# Patient Record
Sex: FEMALE | Race: BLACK OR AFRICAN AMERICAN | NOT HISPANIC OR LATINO | Employment: OTHER | URBAN - METROPOLITAN AREA
[De-identification: names, ages, dates, MRNs, and addresses within clinical notes are randomized per-mention and may not be internally consistent; named-entity substitution may affect disease eponyms.]

---

## 2023-07-26 DIAGNOSIS — E03.9 HYPOTHYROIDISM, UNSPECIFIED: ICD-10-CM

## 2023-07-26 RX ORDER — LEVOTHYROXINE SODIUM 100 UG/1
100 TABLET ORAL DAILY
Qty: 90 TABLET | Refills: 1 | OUTPATIENT
Start: 2023-07-26

## 2023-07-26 NOTE — TELEPHONE ENCOUNTER
Please call to schedule an appointment. We need to check your thyroid levels and perform an exam before prescribing.

## 2023-08-31 DIAGNOSIS — E03.9 HYPOTHYROIDISM, UNSPECIFIED TYPE: Primary | ICD-10-CM

## 2023-08-31 RX ORDER — LEVOTHYROXINE SODIUM 100 UG/1
100 TABLET ORAL DAILY
Qty: 30 TABLET | Refills: 0 | Status: SHIPPED | OUTPATIENT
Start: 2023-08-31 | End: 2023-09-12 | Stop reason: SDUPTHER

## 2023-08-31 RX ORDER — LEVOTHYROXINE SODIUM 100 UG/1
1 TABLET ORAL DAILY
COMMUNITY
Start: 2012-12-07 | End: 2023-08-31 | Stop reason: SDUPTHER

## 2023-09-08 PROBLEM — R73.9 HYPERGLYCEMIA: Status: ACTIVE | Noted: 2023-09-08

## 2023-09-08 PROBLEM — M25.519 SHOULDER PAIN: Status: ACTIVE | Noted: 2023-09-08

## 2023-09-08 PROBLEM — R52 BODY ACHES: Status: ACTIVE | Noted: 2023-09-08

## 2023-09-08 PROBLEM — L30.9 DERMATITIS, ECZEMATOID: Status: ACTIVE | Noted: 2019-01-02

## 2023-09-08 PROBLEM — M54.50 LOW BACK PAIN: Status: ACTIVE | Noted: 2023-09-08

## 2023-09-08 PROBLEM — R92.8 ABNORMAL MAMMOGRAM OF LEFT BREAST: Status: ACTIVE | Noted: 2023-09-08

## 2023-09-08 PROBLEM — R10.814 LEFT LOWER QUADRANT ABDOMINAL TENDERNESS: Status: ACTIVE | Noted: 2023-09-08

## 2023-09-08 PROBLEM — M16.9 OSTEOARTHRITIS OF HIP: Status: ACTIVE | Noted: 2023-09-08

## 2023-09-08 PROBLEM — E66.9 OBESITY: Status: ACTIVE | Noted: 2023-09-08

## 2023-09-08 PROBLEM — J30.9 ALLERGIC RHINITIS: Status: ACTIVE | Noted: 2023-09-08

## 2023-09-08 PROBLEM — R63.5 WEIGHT GAIN: Status: ACTIVE | Noted: 2023-09-08

## 2023-09-08 PROBLEM — M25.552 LEFT HIP PAIN: Status: ACTIVE | Noted: 2023-09-08

## 2023-09-08 PROBLEM — M79.2 NEUROPATHIC PAIN OF LEFT THIGH: Status: ACTIVE | Noted: 2023-09-08

## 2023-09-08 PROBLEM — J31.0 CHRONIC RHINITIS: Status: ACTIVE | Noted: 2023-09-08

## 2023-09-08 PROBLEM — R21 SKIN RASH: Status: ACTIVE | Noted: 2023-09-08

## 2023-09-08 PROBLEM — M16.12 ARTHRITIS OF LEFT HIP: Status: ACTIVE | Noted: 2023-09-08

## 2023-09-08 PROBLEM — R20.2 PARESTHESIA OF LEFT FOOT: Status: ACTIVE | Noted: 2023-09-08

## 2023-09-08 PROBLEM — E55.9 VITAMIN D DEFICIENCY: Status: ACTIVE | Noted: 2023-09-08

## 2023-09-08 PROBLEM — R13.10 DYSPHAGIA: Status: ACTIVE | Noted: 2023-09-08

## 2023-09-08 PROBLEM — E03.9 HYPOTHYROIDISM: Status: ACTIVE | Noted: 2023-09-08

## 2023-09-08 PROBLEM — R30.0 DYSURIA: Status: ACTIVE | Noted: 2023-09-08

## 2023-09-08 PROBLEM — L30.0 NUMMULAR DERMATITIS: Status: ACTIVE | Noted: 2019-01-02

## 2023-09-08 PROBLEM — R07.9 CHEST PAIN: Status: ACTIVE | Noted: 2023-09-08

## 2023-09-08 PROBLEM — E78.5 HYPERLIPIDEMIA: Status: ACTIVE | Noted: 2023-09-08

## 2023-09-08 RX ORDER — TRIAMCINOLONE ACETONIDE 1 MG/G
CREAM TOPICAL
COMMUNITY
Start: 2016-12-20 | End: 2023-09-12 | Stop reason: WASHOUT

## 2023-09-08 RX ORDER — CHOLECALCIFEROL (VITAMIN D3) 50 MCG
1 TABLET ORAL DAILY
COMMUNITY
Start: 2023-02-02

## 2023-09-12 ENCOUNTER — LAB (OUTPATIENT)
Dept: LAB | Facility: LAB | Age: 63
End: 2023-09-12
Payer: COMMERCIAL

## 2023-09-12 ENCOUNTER — OFFICE VISIT (OUTPATIENT)
Dept: PRIMARY CARE | Facility: CLINIC | Age: 63
End: 2023-09-12
Payer: COMMERCIAL

## 2023-09-12 VITALS — WEIGHT: 240 LBS | SYSTOLIC BLOOD PRESSURE: 123 MMHG | BODY MASS INDEX: 39.94 KG/M2 | DIASTOLIC BLOOD PRESSURE: 76 MMHG

## 2023-09-12 DIAGNOSIS — R73.03 PREDIABETES: ICD-10-CM

## 2023-09-12 DIAGNOSIS — E55.9 VITAMIN D DEFICIENCY: ICD-10-CM

## 2023-09-12 DIAGNOSIS — Z00.00 ROUTINE GENERAL MEDICAL EXAMINATION AT A HEALTH CARE FACILITY: Primary | ICD-10-CM

## 2023-09-12 DIAGNOSIS — Z23 NEED FOR MMR VACCINE: ICD-10-CM

## 2023-09-12 DIAGNOSIS — E03.9 HYPOTHYROIDISM, UNSPECIFIED TYPE: ICD-10-CM

## 2023-09-12 DIAGNOSIS — R73.9 HYPERGLYCEMIA: ICD-10-CM

## 2023-09-12 DIAGNOSIS — Z12.4 SCREENING FOR CERVICAL CANCER: ICD-10-CM

## 2023-09-12 DIAGNOSIS — E78.2 MODERATE MIXED HYPERLIPIDEMIA NOT REQUIRING STATIN THERAPY: ICD-10-CM

## 2023-09-12 LAB
CALCIDIOL (25 OH VITAMIN D3) (NG/ML) IN SER/PLAS: 20 NG/ML
CHOLESTEROL (MG/DL) IN SER/PLAS: 225 MG/DL (ref 0–199)
CHOLESTEROL IN HDL (MG/DL) IN SER/PLAS: 83.7 MG/DL
CHOLESTEROL/HDL RATIO: 2.7
ESTIMATED AVERAGE GLUCOSE FOR HBA1C: 114 MG/DL
HEMOGLOBIN A1C/HEMOGLOBIN TOTAL IN BLOOD: 5.6 %
LDL: 125 MG/DL (ref 0–99)
THYROTROPIN (MIU/L) IN SER/PLAS BY DETECTION LIMIT <= 0.05 MIU/L: 1.55 MIU/L (ref 0.44–3.98)
TRIGLYCERIDE (MG/DL) IN SER/PLAS: 81 MG/DL (ref 0–149)
VLDL: 16 MG/DL (ref 0–40)

## 2023-09-12 PROCEDURE — 84443 ASSAY THYROID STIM HORMONE: CPT

## 2023-09-12 PROCEDURE — 99396 PREV VISIT EST AGE 40-64: CPT | Performed by: INTERNAL MEDICINE

## 2023-09-12 PROCEDURE — 36415 COLL VENOUS BLD VENIPUNCTURE: CPT

## 2023-09-12 PROCEDURE — 80061 LIPID PANEL: CPT

## 2023-09-12 PROCEDURE — 82306 VITAMIN D 25 HYDROXY: CPT

## 2023-09-12 PROCEDURE — 83036 HEMOGLOBIN GLYCOSYLATED A1C: CPT

## 2023-09-12 RX ORDER — CHOLECALCIFEROL (VITAMIN D3) 50 MCG
50 TABLET ORAL DAILY
Qty: 90 TABLET | Refills: 1 | Status: CANCELLED | OUTPATIENT
Start: 2023-09-12

## 2023-09-12 RX ORDER — LEVOTHYROXINE SODIUM 100 UG/1
100 TABLET ORAL DAILY
Qty: 90 TABLET | Refills: 1 | Status: SHIPPED | OUTPATIENT
Start: 2023-09-12 | End: 2024-03-26

## 2023-09-12 ASSESSMENT — PROMIS GLOBAL HEALTH SCALE
EMOTIONAL_PROBLEMS: RARELY
CARRYOUT_SOCIAL_ACTIVITIES: GOOD
RATE_AVERAGE_PAIN: 2
RATE_MENTAL_HEALTH: VERY GOOD
CARRYOUT_PHYSICAL_ACTIVITIES: MOSTLY
RATE_GENERAL_HEALTH: FAIR
RATE_SOCIAL_SATISFACTION: VERY GOOD
RATE_AVERAGE_FATIGUE: MILD
RATE_QUALITY_OF_LIFE: GOOD
RATE_PHYSICAL_HEALTH: GOOD

## 2023-09-12 NOTE — PROGRESS NOTES
Subjective   Patient ID: Dora Trimble is a 63 y.o. female who presents for medications refill and annual medical visit    Med Refill       63-year-old female with past medical history of vitamin D deficiency, HLD, prediabetes, hypothyroidism, and hyperlipidemia who presents to the office for follow-up, patient reported that since her last visit here she has been doing well denies any recent hospitalization or illnesses and denies any further or new complaints.     Review of Systems   All other systems reviewed and are negative.      Objective   /76   Wt 109 kg (240 lb)   BMI 39.94 kg/m²     Physical Exam  Vitals and nursing note reviewed.   Constitutional:       Appearance: Normal appearance. She is normal weight.   HENT:      Head: Normocephalic and atraumatic.      Right Ear: Tympanic membrane normal.      Nose: Nose normal.      Mouth/Throat:      Mouth: Mucous membranes are moist.   Eyes:      Extraocular Movements: Extraocular movements intact.   Cardiovascular:      Rate and Rhythm: Normal rate.      Pulses: Normal pulses.      Heart sounds: Normal heart sounds.   Pulmonary:      Effort: Pulmonary effort is normal.      Breath sounds: Normal breath sounds.   Abdominal:      General: Abdomen is flat. Bowel sounds are normal.      Palpations: Abdomen is soft.   Musculoskeletal:         General: Normal range of motion.   Skin:     General: Skin is warm and dry.      Capillary Refill: Capillary refill takes less than 2 seconds.   Neurological:      General: No focal deficit present.      Mental Status: She is alert and oriented to person, place, and time.   Psychiatric:         Mood and Affect: Mood normal.         Behavior: Behavior normal.         Thought Content: Thought content normal.         Judgment: Judgment normal.         Assessment/Plan   Diagnoses and all orders for this visit:  Vitamin D deficiency  -     Vitamin D 25-Hydroxy,Total (for eval of Vitamin D levels); Future  Hypothyroidism,  unspecified type  -     levothyroxine (Synthroid, Levoxyl) 100 mcg tablet; Take 1 tablet (100 mcg) by mouth once daily.  -     Tsh With Reflex To Free T4 If Abnormal; Future  Moderate mixed hyperlipidemia not requiring statin therapy  -     Lipid panel; Future  Hyperglycemia  Prediabetes  -     Hemoglobin A1C; Future  Need for MMR vaccine  Screening for cervical cancer

## 2023-09-18 RX ORDER — ACETAMINOPHEN 500 MG
1 TABLET ORAL EVERY 6 HOURS PRN
COMMUNITY
Start: 2022-11-01 | End: 2024-03-12 | Stop reason: WASHOUT

## 2023-09-18 RX ORDER — METHOCARBAMOL 500 MG/1
1 TABLET, FILM COATED ORAL EVERY 6 HOURS PRN
COMMUNITY
Start: 2022-11-01 | End: 2024-03-12 | Stop reason: WASHOUT

## 2023-10-26 ENCOUNTER — APPOINTMENT (OUTPATIENT)
Dept: ORTHOPEDIC SURGERY | Facility: CLINIC | Age: 63
End: 2023-10-26
Payer: COMMERCIAL

## 2024-03-12 ENCOUNTER — TELEMEDICINE (OUTPATIENT)
Dept: PRIMARY CARE | Facility: CLINIC | Age: 64
End: 2024-03-12
Payer: COMMERCIAL

## 2024-03-12 DIAGNOSIS — J06.9 UPPER RESPIRATORY TRACT INFECTION, UNSPECIFIED TYPE: ICD-10-CM

## 2024-03-12 DIAGNOSIS — Z12.31 ENCOUNTER FOR SCREENING MAMMOGRAM FOR MALIGNANT NEOPLASM OF BREAST: Primary | ICD-10-CM

## 2024-03-12 DIAGNOSIS — E55.9 VITAMIN D DEFICIENCY: ICD-10-CM

## 2024-03-12 PROCEDURE — 99214 OFFICE O/P EST MOD 30 MIN: CPT | Performed by: INTERNAL MEDICINE

## 2024-03-12 RX ORDER — CETIRIZINE HYDROCHLORIDE 10 MG/1
10 TABLET ORAL DAILY
Qty: 30 TABLET | Refills: 0 | Status: SHIPPED | OUTPATIENT
Start: 2024-03-12 | End: 2024-04-04

## 2024-03-12 RX ORDER — AZITHROMYCIN 250 MG/1
TABLET, FILM COATED ORAL
Qty: 6 TABLET | Refills: 0 | Status: SHIPPED | OUTPATIENT
Start: 2024-03-12 | End: 2024-03-17

## 2024-03-12 RX ORDER — ASPIRIN 325 MG
50000 TABLET, DELAYED RELEASE (ENTERIC COATED) ORAL
Qty: 8 CAPSULE | Refills: 0 | Status: SHIPPED | OUTPATIENT
Start: 2024-03-12 | End: 2025-03-12

## 2024-03-12 RX ORDER — FLUTICASONE PROPIONATE 50 MCG
1 SPRAY, SUSPENSION (ML) NASAL DAILY
Qty: 16 G | Refills: 5 | Status: SHIPPED | OUTPATIENT
Start: 2024-03-12 | End: 2025-03-12

## 2024-03-12 ASSESSMENT — ENCOUNTER SYMPTOMS
COUGH: 1
SWEATS: 0
RHINORRHEA: 1
WEIGHT LOSS: 0
FEVER: 0
MYALGIAS: 0
HEARTBURN: 0
SHORTNESS OF BREATH: 0
CHILLS: 0
HEMOPTYSIS: 0
WHEEZING: 0
HEADACHES: 0
SORE THROAT: 0

## 2024-03-12 NOTE — PROGRESS NOTES
Subjective   Patient ID: Dora Trimble is a 63 y.o. female who presents via virtual visit for Sick Visit.  An interactive audio and video telecommunication system which permits real time communications between the patient (at the originating site) and provider (at the distant site) was utilized to provide this telehealth service.    Verbal consent was requested and obtained from the patient on the day of encounter.  Cough  The current episode started more than 1 month ago. The problem has been unchanged. The problem occurs every few hours. The cough is Productive of sputum. Associated symptoms include ear congestion, nasal congestion, postnasal drip and rhinorrhea. Pertinent negatives include no chest pain, chills, ear pain, fever, headaches, heartburn, hemoptysis, myalgias, rash, sore throat, shortness of breath, sweats, weight loss or wheezing. The symptoms are aggravated by lying down.       Review of Systems   Constitutional:  Negative for chills, fever and weight loss.   HENT:  Positive for postnasal drip and rhinorrhea. Negative for ear pain and sore throat.    Respiratory:  Positive for cough. Negative for hemoptysis, shortness of breath and wheezing.    Cardiovascular:  Negative for chest pain.   Gastrointestinal:  Negative for heartburn.   Musculoskeletal:  Negative for myalgias.   Skin:  Negative for rash.   Neurological:  Negative for headaches.       Objective   Physical Exam  Constitutional:       General: She is awake.      Appearance: Normal appearance. She is well-developed.   Neurological:      Mental Status: She is alert.   Psychiatric:         Mood and Affect: Mood normal.         Behavior: Behavior normal.         Assessment/Plan     Problem List Items Addressed This Visit       Vitamin D deficiency    Relevant Medications    cholecalciferol (Vitamin D-3) 50,000 unit capsule     Other Visit Diagnoses       Encounter for screening mammogram for malignant neoplasm of breast    -  Primary     Relevant Orders    BI mammo bilateral screening tomosynthesis    Upper respiratory tract infection, unspecified type        Relevant Medications    azithromycin (Zithromax) 250 mg tablet    cetirizine (ZyrTEC) 10 mg tablet    fluticasone (Flonase) 50 mcg/actuation nasal spray

## 2024-03-25 DIAGNOSIS — E03.9 HYPOTHYROIDISM, UNSPECIFIED TYPE: ICD-10-CM

## 2024-03-26 RX ORDER — LEVOTHYROXINE SODIUM 100 UG/1
100 TABLET ORAL DAILY
Qty: 90 TABLET | Refills: 1 | Status: SHIPPED | OUTPATIENT
Start: 2024-03-26

## 2024-04-03 DIAGNOSIS — J06.9 UPPER RESPIRATORY TRACT INFECTION, UNSPECIFIED TYPE: ICD-10-CM

## 2024-04-04 RX ORDER — CETIRIZINE HYDROCHLORIDE 10 MG/1
10 TABLET ORAL DAILY
Qty: 90 TABLET | Refills: 0 | Status: SHIPPED | OUTPATIENT
Start: 2024-04-04

## 2024-04-16 ENCOUNTER — HOSPITAL ENCOUNTER (OUTPATIENT)
Dept: RADIOLOGY | Facility: CLINIC | Age: 64
Discharge: HOME | End: 2024-04-16
Payer: COMMERCIAL

## 2024-04-16 VITALS — WEIGHT: 235 LBS | BODY MASS INDEX: 39.15 KG/M2 | HEIGHT: 65 IN

## 2024-04-16 DIAGNOSIS — Z12.31 ENCOUNTER FOR SCREENING MAMMOGRAM FOR MALIGNANT NEOPLASM OF BREAST: ICD-10-CM

## 2024-04-16 PROCEDURE — 77067 SCR MAMMO BI INCL CAD: CPT | Performed by: RADIOLOGY

## 2024-04-16 PROCEDURE — 77063 BREAST TOMOSYNTHESIS BI: CPT | Performed by: RADIOLOGY

## 2024-04-16 PROCEDURE — 77067 SCR MAMMO BI INCL CAD: CPT

## 2024-08-29 ENCOUNTER — HOSPITAL ENCOUNTER (OUTPATIENT)
Dept: RADIOLOGY | Facility: CLINIC | Age: 64
Discharge: HOME | End: 2024-08-29

## 2024-08-29 ENCOUNTER — OFFICE VISIT (OUTPATIENT)
Dept: ORTHOPEDIC SURGERY | Facility: CLINIC | Age: 64
End: 2024-08-29

## 2024-08-29 VITALS — WEIGHT: 235 LBS | HEIGHT: 65 IN | BODY MASS INDEX: 39.15 KG/M2

## 2024-08-29 DIAGNOSIS — M25.552 LEFT HIP PAIN: ICD-10-CM

## 2024-08-29 DIAGNOSIS — M16.12 PRIMARY OSTEOARTHRITIS OF LEFT HIP: Primary | ICD-10-CM

## 2024-08-29 PROCEDURE — 99213 OFFICE O/P EST LOW 20 MIN: CPT | Performed by: ORTHOPAEDIC SURGERY

## 2024-08-29 PROCEDURE — 73502 X-RAY EXAM HIP UNI 2-3 VIEWS: CPT | Mod: LT

## 2024-08-29 PROCEDURE — 3008F BODY MASS INDEX DOCD: CPT | Performed by: ORTHOPAEDIC SURGERY

## 2024-08-29 ASSESSMENT — PAIN DESCRIPTION - DESCRIPTORS: DESCRIPTORS: ACHING

## 2024-08-29 ASSESSMENT — PAIN SCALES - GENERAL: PAINLEVEL_OUTOF10: 8

## 2024-08-29 ASSESSMENT — PAIN - FUNCTIONAL ASSESSMENT: PAIN_FUNCTIONAL_ASSESSMENT: 0-10

## 2024-08-29 NOTE — PROGRESS NOTES
Patient is a 64-year-old female presents today for evaluation of left hip arthritis.  She has known advanced underlying osteoarthritis.  She takes occasional Tylenol.  She does not take any significant anti-inflammatories.  She is never had an injection in her hip.  Her BMI is 39.  She has difficulty walking sort of distance or putting on her shoes and socks.  She rates her pain at times as 8 out of 10.    Left hip:  AAOx3, NAD, walks with a moderate antalgic gait  Significant pain with flexion internal rotation  Positive [facility]  Mild TTP over trochanter laterally  5/5 Hip flexion/knee extension/df/pf/ehl  SILT in a jf/saph/per/tib distribution  ½ dorsalis pedis and posterior tibial pulse  no popliteal or inguinal lymphadenopathy  no other overlying lesions  mood: euthymic  Respirations non labored    Plain films were reviewed by myself in clinic today.  She has advanced osteoarthritis of her left hip with complete loss of joint space, underlying sclerosis and bipolar osteophytic change.    We discussed further conservative treatment versus surgery with her today in clinic.  I think injections will be of little benefit to her.  She is get my office card and number and call to schedule surgery if she decides to proceed.  She needs to work on weight loss is much as she can.  All of her questions were answered.  Would see her back at a time for a full surgical consult.    [id number] [id number]  [facility]  Outpatient

## 2024-09-03 ENCOUNTER — APPOINTMENT (OUTPATIENT)
Dept: PRIMARY CARE | Facility: CLINIC | Age: 64
End: 2024-09-03
Payer: COMMERCIAL

## 2024-09-03 ENCOUNTER — LAB (OUTPATIENT)
Dept: LAB | Facility: LAB | Age: 64
End: 2024-09-03
Payer: COMMERCIAL

## 2024-09-03 VITALS
BODY MASS INDEX: 40.98 KG/M2 | SYSTOLIC BLOOD PRESSURE: 163 MMHG | HEART RATE: 60 BPM | DIASTOLIC BLOOD PRESSURE: 82 MMHG | WEIGHT: 246 LBS | HEIGHT: 65 IN

## 2024-09-03 DIAGNOSIS — E55.9 VITAMIN D DEFICIENCY: ICD-10-CM

## 2024-09-03 DIAGNOSIS — E78.5 HYPERLIPIDEMIA, UNSPECIFIED HYPERLIPIDEMIA TYPE: ICD-10-CM

## 2024-09-03 DIAGNOSIS — M25.552 LEFT HIP PAIN: ICD-10-CM

## 2024-09-03 DIAGNOSIS — Z00.00 GENERAL MEDICAL EXAM: ICD-10-CM

## 2024-09-03 DIAGNOSIS — R03.0 ELEVATED BLOOD PRESSURE READING: ICD-10-CM

## 2024-09-03 DIAGNOSIS — E03.9 HYPOTHYROIDISM, UNSPECIFIED TYPE: ICD-10-CM

## 2024-09-03 DIAGNOSIS — R73.9 HYPERGLYCEMIA: ICD-10-CM

## 2024-09-03 DIAGNOSIS — Z00.00 GENERAL MEDICAL EXAM: Primary | ICD-10-CM

## 2024-09-03 DIAGNOSIS — E66.01 CLASS 3 SEVERE OBESITY WITHOUT SERIOUS COMORBIDITY WITH BODY MASS INDEX (BMI) OF 40.0 TO 44.9 IN ADULT, UNSPECIFIED OBESITY TYPE (MULTI): ICD-10-CM

## 2024-09-03 LAB
25(OH)D3 SERPL-MCNC: 29 NG/ML (ref 30–100)
ALBUMIN SERPL BCP-MCNC: 4 G/DL (ref 3.4–5)
ALP SERPL-CCNC: 59 U/L (ref 33–136)
ALT SERPL W P-5'-P-CCNC: 11 U/L (ref 7–45)
ANION GAP SERPL CALC-SCNC: 14 MMOL/L (ref 10–20)
AST SERPL W P-5'-P-CCNC: 14 U/L (ref 9–39)
BASOPHILS # BLD AUTO: 0.05 X10*3/UL (ref 0–0.1)
BASOPHILS NFR BLD AUTO: 0.8 %
BILIRUB SERPL-MCNC: 0.4 MG/DL (ref 0–1.2)
BUN SERPL-MCNC: 15 MG/DL (ref 6–23)
CALCIUM SERPL-MCNC: 8.3 MG/DL (ref 8.6–10.6)
CHLORIDE SERPL-SCNC: 102 MMOL/L (ref 98–107)
CHOLEST SERPL-MCNC: 218 MG/DL (ref 0–199)
CHOLESTEROL/HDL RATIO: 2.9
CO2 SERPL-SCNC: 29 MMOL/L (ref 21–32)
CREAT SERPL-MCNC: 0.78 MG/DL (ref 0.5–1.05)
EGFRCR SERPLBLD CKD-EPI 2021: 85 ML/MIN/1.73M*2
EOSINOPHIL # BLD AUTO: 0.13 X10*3/UL (ref 0–0.7)
EOSINOPHIL NFR BLD AUTO: 2.1 %
ERYTHROCYTE [DISTWIDTH] IN BLOOD BY AUTOMATED COUNT: 14.5 % (ref 11.5–14.5)
EST. AVERAGE GLUCOSE BLD GHB EST-MCNC: 120 MG/DL
GLUCOSE SERPL-MCNC: 109 MG/DL (ref 74–99)
HBA1C MFR BLD: 5.8 %
HCT VFR BLD AUTO: 45.2 % (ref 36–46)
HDLC SERPL-MCNC: 74.5 MG/DL
HGB BLD-MCNC: 13.7 G/DL (ref 12–16)
IMM GRANULOCYTES # BLD AUTO: 0.04 X10*3/UL (ref 0–0.7)
IMM GRANULOCYTES NFR BLD AUTO: 0.6 % (ref 0–0.9)
LDLC SERPL CALC-MCNC: 127 MG/DL
LYMPHOCYTES # BLD AUTO: 1.95 X10*3/UL (ref 1.2–4.8)
LYMPHOCYTES NFR BLD AUTO: 31 %
MCH RBC QN AUTO: 27.1 PG (ref 26–34)
MCHC RBC AUTO-ENTMCNC: 30.3 G/DL (ref 32–36)
MCV RBC AUTO: 90 FL (ref 80–100)
MONOCYTES # BLD AUTO: 0.39 X10*3/UL (ref 0.1–1)
MONOCYTES NFR BLD AUTO: 6.2 %
NEUTROPHILS # BLD AUTO: 3.74 X10*3/UL (ref 1.2–7.7)
NEUTROPHILS NFR BLD AUTO: 59.3 %
NON HDL CHOLESTEROL: 144 MG/DL (ref 0–149)
NRBC BLD-RTO: 0 /100 WBCS (ref 0–0)
PLATELET # BLD AUTO: 329 X10*3/UL (ref 150–450)
POTASSIUM SERPL-SCNC: 4.3 MMOL/L (ref 3.5–5.3)
PROT SERPL-MCNC: 7.5 G/DL (ref 6.4–8.2)
RBC # BLD AUTO: 5.05 X10*6/UL (ref 4–5.2)
SODIUM SERPL-SCNC: 141 MMOL/L (ref 136–145)
TRIGL SERPL-MCNC: 84 MG/DL (ref 0–149)
TSH SERPL-ACNC: 1.5 MIU/L (ref 0.44–3.98)
VLDL: 17 MG/DL (ref 0–40)
WBC # BLD AUTO: 6.3 X10*3/UL (ref 4.4–11.3)

## 2024-09-03 PROCEDURE — 83036 HEMOGLOBIN GLYCOSYLATED A1C: CPT

## 2024-09-03 PROCEDURE — 84443 ASSAY THYROID STIM HORMONE: CPT

## 2024-09-03 PROCEDURE — 3008F BODY MASS INDEX DOCD: CPT | Performed by: INTERNAL MEDICINE

## 2024-09-03 PROCEDURE — 36415 COLL VENOUS BLD VENIPUNCTURE: CPT

## 2024-09-03 PROCEDURE — 99396 PREV VISIT EST AGE 40-64: CPT | Performed by: INTERNAL MEDICINE

## 2024-09-03 PROCEDURE — 1036F TOBACCO NON-USER: CPT | Performed by: INTERNAL MEDICINE

## 2024-09-03 PROCEDURE — 82306 VITAMIN D 25 HYDROXY: CPT

## 2024-09-03 PROCEDURE — 80061 LIPID PANEL: CPT

## 2024-09-03 PROCEDURE — 85025 COMPLETE CBC W/AUTO DIFF WBC: CPT

## 2024-09-03 PROCEDURE — 80053 COMPREHEN METABOLIC PANEL: CPT

## 2024-09-03 RX ORDER — LEVOTHYROXINE SODIUM 100 UG/1
100 TABLET ORAL DAILY
Qty: 90 TABLET | Refills: 1 | Status: SHIPPED | OUTPATIENT
Start: 2024-09-03

## 2024-09-03 RX ORDER — MELOXICAM 15 MG/1
15 TABLET ORAL DAILY
Qty: 90 TABLET | Refills: 1 | Status: SHIPPED | OUTPATIENT
Start: 2024-09-03 | End: 2025-03-02

## 2024-09-03 ASSESSMENT — LIFESTYLE VARIABLES
HOW MANY STANDARD DRINKS CONTAINING ALCOHOL DO YOU HAVE ON A TYPICAL DAY: 1 OR 2
SKIP TO QUESTIONS 9-10: 1
AUDIT-C TOTAL SCORE: 1
HOW OFTEN DO YOU HAVE SIX OR MORE DRINKS ON ONE OCCASION: NEVER
HOW OFTEN DO YOU HAVE A DRINK CONTAINING ALCOHOL: MONTHLY OR LESS

## 2024-09-03 ASSESSMENT — PATIENT HEALTH QUESTIONNAIRE - PHQ9
1. LITTLE INTEREST OR PLEASURE IN DOING THINGS: NOT AT ALL
2. FEELING DOWN, DEPRESSED OR HOPELESS: NOT AT ALL
SUM OF ALL RESPONSES TO PHQ9 QUESTIONS 1 AND 2: 0

## 2024-09-03 NOTE — PROGRESS NOTES
Primary care office Note      Name: Dora Trimble, Age: 64 y.o., Gender: female, MRN: [id number]   Pharmacy:   [facility]/pharmacy #[id number] [facility] [address] [facility]  [address] [facility] [address]  Phone: [phone] Fax: [phone]     PCP: Olga Johnson        Subjective:   Chief Complaint   Patient presents with    Annual Exam        Dora Trimble is a 64 y.o. female who presented to the clinic today for CPE    HPI:   Dora Trimble is a 64 y.o. female     Loose stools  -states maybe from too much junk food, likes to eat sweet  -pretty frequent  -imodium seems to help     Left hip  -saw ortho, going to have hip replacement    The patient denies headaches, lightheadedness, changes in speech/vision, photophobia, dysphagia, diaphoresis, Chest pain, dyspnea, Abdominal pain, recent falls or trauma, and changes in bowel/urinary habits.     ROS:   12 points review of system is negative excepted as stated above in the HPI.    Medical History      PMH:    has a past medical history of Acute upper respiratory infection, unspecified (01/28/2016), Body mass index (BMI)40.0-44.9, adult (01/31/2020), Cellulitis of left lower limb (01/03/2014), Encounter for gynecological examination (general) (routine) without abnormal findings (12/11/2013), Other bursitis of hip, left hip (12/11/2013), Pain in throat (05/06/2015), Pain in unspecified hip (12/11/2013), Personal history of other diseases of the nervous system and sense organs (05/19/2014), Personal history of other diseases of the respiratory system (07/23/2014), Personal history of other endocrine, nutritional and metabolic disease, Personal history of other infectious and parasitic diseases (05/19/2014), Personal history of other specified conditions (05/06/2015), and Rash and other nonspecific skin eruption (01/03/2014).   Allergies:   Allergies   Allergen Reactions    Bee Pollen Unknown    Cat Dander Unknown    Shellfish Containing Products Unknown       Surgical Hx:   Past Surgical History:   Procedure Laterality Date    COLONOSCOPY  12/10/2013    Complete Colonoscopy    OTHER SURGICAL HISTORY  12/11/2013    Thyroid Surgery Sub-Total Thyroidectomy    OTHER SURGICAL HISTORY  12/10/2013    Nerve Block Transforaminal Epidural Lumbar      Social HX:   Social History     Tobacco Use    Smoking status: Never     Passive exposure: Never    Smokeless tobacco: Never   Substance Use Topics    Alcohol use: Not Currently     Comment: occasionally    Drug use: Never        MEDS:   Current Outpatient Medications   Medication Instructions    cetirizine (ZYRTEC) 10 mg, oral, Daily    cholecalciferol (Vitamin D-3) 50 MCG (2000 UT) tablet 1 tablet, oral, Daily    cholecalciferol (VITAMIN D-3) 50,000 Units, oral, Once Weekly    fluticasone (Flonase) 50 mcg/actuation nasal spray 1 spray, Each Nostril, Daily, Shake gently. Before first use, prime pump. After use, clean tip and replace cap.    levothyroxine (SYNTHROID, LEVOXYL) 100 mcg, oral, Daily    meloxicam (MOBIC) 15 mg, oral, Daily        Objective Data     Objective:   Visit Vitals  /82 (BP Location: Right arm, Patient Position: Sitting, BP Cuff Size: Large adult)   Pulse 60        Physical Examination:   GE; sitting comfortably in a chair, in NAD  HEENT; AT/NC, no conjunctival pallor, anicteric sclera  Neck; Supple neck, no LAD/JVD  Chest; CTAbl, no adventitious sounds  CVS; s1 and s2 only no MGR, RRR  Ext; no cyanosis/clubbing/edema, pulses intact  Neuro; Aox3  Psych; normal mood     Last Labs:   CBC:   WBC   Date Value Ref Range Status   01/26/2023 6.5 4.4 - 11.3 x10E9/L Final   05/03/2021 7.7 4.4 - 11.3 x10E9/L Final   12/28/2018 6.3 4.4 - 11.3 x10E9/L Final     Hemoglobin   Date Value Ref Range Status   01/26/2023 13.2 12.0 - 16.0 g/dL Final   05/03/2021 13.7 12.0 - 16.0 g/dL Final   12/28/2018 12.6 12.0 - 16.0 g/dL Final     MCV   Date Value Ref Range Status   01/26/2023 88 80 - 100 fL Final   05/03/2021 89 80 - 100  fL Final   12/28/2018 91 80 - 100 fL Final     Platelets   Date Value Ref Range Status   01/26/2023 367 150 - 450 x10E9/L Final   05/03/2021 370 150 - 450 x10E9/L Final   12/28/2018 307 150 - 450 x10E9/L Final      CMP:   Sodium   Date Value Ref Range Status   01/26/2023 141 136 - 145 mmol/L Final   05/03/2021 141 136 - 145 mmol/L Final   12/28/2018 138 136 - 145 mmol/L Final     Potassium   Date Value Ref Range Status   01/26/2023 4.4 3.5 - 5.3 mmol/L Final   05/03/2021 4.3 3.5 - 5.3 mmol/L Final   12/28/2018 4.0 3.5 - 5.3 mmol/L Final     Chloride   Date Value Ref Range Status   01/26/2023 105 98 - 107 mmol/L Final   05/03/2021 104 98 - 107 mmol/L Final   12/28/2018 103 98 - 107 mmol/L Final     Urea Nitrogen   Date Value Ref Range Status   01/26/2023 18 6 - 23 mg/dL Final   05/03/2021 19 6 - 23 mg/dL Final   12/28/2018 21 6 - 23 mg/dL Final     Creatinine   Date Value Ref Range Status   01/26/2023 0.84 0.50 - 1.05 mg/dL Final   05/03/2021 0.76 0.50 - 1.05 mg/dL Final   12/28/2018 0.76 0.50 - 1.05 mg/dL Final      A1c:   Hemoglobin A1C   Date Value Ref Range Status   09/03/2024 5.8 (H) see below % Final   09/12/2023 5.6 % Final     Comment:          Diagnosis of Diabetes-Adults   Non-Diabetic: < or = 5.6%   Increased risk for developing diabetes: 5.7-6.4%   Diagnostic of diabetes: > or = 6.5%  .       Monitoring of Diabetes                Age (y)     Therapeutic Goal (%)   Adults:          >18           <7.0   Pediatrics:    13-18           <7.5                   7-12           <8.0                   0- 6            7.5-8.5   American Diabetes Association. Diabetes Care 33(S1), Jan 2010.   01/26/2023 5.7 (A) % Final     Comment:          Diagnosis of Diabetes-Adults   Non-Diabetic: < or = 5.6%   Increased risk for developing diabetes: 5.7-6.4%   Diagnostic of diabetes: > or = 6.5%  .       Monitoring of Diabetes                Age (y)     Therapeutic Goal (%)   Adults:          >18           <7.0   Pediatrics:     13-18           <7.5                   7-12           <8.0                   0- 6            7.5-8.5   American Diabetes Association. Diabetes Care 33(S1), Jan 2010.          Other labs;   Vit D:   Vitamin D, 25-Hydroxy   Date Value Ref Range Status   09/12/2023 20 (A) ng/mL Final     Comment:     .  DEFICIENCY:         < 20   NG/ML  INSUFFICIENCY:      20-29  NG/ML  SUFFICIENCY:         NG/ML    THIS ASSAY ACCURATELY QUANTIFIES THE SUM OF  VITAMIN D3, 25-HYDROXY AND VIT D2,25-HYDROXY.   01/26/2023 18 (A) ng/mL Final     Comment:     .  DEFICIENCY:         < 20   NG/ML  INSUFFICIENCY:      20-29  NG/ML  SUFFICIENCY:         NG/ML    THIS ASSAY ACCURATELY QUANTIFIES THE SUM OF  VITAMIN D3, 25-HYDROXY AND VIT D2,25-HYDROXY.     03/09/2021 17 (A) ng/mL Final     Comment:     .  DEFICIENCY:         < 20   NG/ML  INSUFFICIENCY:      20-29  NG/ML  SUFFICIENCY:         NG/ML    THIS ASSAY ACCURATELY QUANTIFIES THE SUM OF  VITAMIN D3, 25-HYDROXY AND VIT D2,25-HYDROXY.        TSH:  TSH   Date Value Ref Range Status   09/12/2023 1.55 0.44 - 3.98 mIU/L Final     Comment:      TSH testing is performed using different testing    methodology at [facility] than at other    [facility] hospitals. Direct result comparisons should    only be made within the same method.   01/26/2023 1.63 0.44 - 3.98 mIU/L Final     Comment:      TSH testing is performed using different testing    methodology at [facility] than at other    system hospitals. Direct result comparisons should    only be made within the same method.     11/17/2021 1.19 0.44 - 3.98 mIU/L Final     Comment:      TSH testing is performed using different testing    methodology at [facility] than at other    system hospitals. Direct result comparisons should    only be made within the same method.          Assessment and Plan     Problem List Items Addressed This Visit       Left hip pain    Relevant Medications     meloxicam (Mobic) 15 mg tablet    Other Relevant Orders    Disability Placard    Hyperglycemia    Relevant Orders    Hemoglobin A1C (Completed)    Comprehensive Metabolic Panel    Hyperlipidemia    Relevant Orders    Lipid Panel    Hypothyroidism    Relevant Medications    levothyroxine (Synthroid, Levoxyl) 100 mcg tablet    Other Relevant Orders    TSH with reflex to Free T4 if abnormal    Obesity - Primary    Relevant Orders    Referral to Nutrition Services    Vitamin D deficiency    Relevant Orders    Vitamin D 25-Hydroxy,Total (for eval of Vitamin D levels)     Other Visit Diagnoses       General medical exam        Relevant Orders    CBC and Auto Differential    Elevated blood pressure reading        Will come back next week for BP check. If still elevated we will need to add medication.  Added NSAID for pain if this might be contributing to elevated BP.            Olga Johnson, DO     Follow up 1 week for BP check

## 2024-09-10 ENCOUNTER — APPOINTMENT (OUTPATIENT)
Dept: PRIMARY CARE | Facility: CLINIC | Age: 64
End: 2024-09-10
Payer: COMMERCIAL

## 2024-09-10 VITALS
WEIGHT: 250 LBS | DIASTOLIC BLOOD PRESSURE: 80 MMHG | SYSTOLIC BLOOD PRESSURE: 130 MMHG | HEART RATE: 60 BPM | HEIGHT: 65 IN | BODY MASS INDEX: 41.65 KG/M2

## 2024-09-10 DIAGNOSIS — M16.12 PRIMARY OSTEOARTHRITIS OF LEFT HIP: Primary | ICD-10-CM

## 2024-09-10 PROCEDURE — 99214 OFFICE O/P EST MOD 30 MIN: CPT | Performed by: INTERNAL MEDICINE

## 2024-09-10 PROCEDURE — 3008F BODY MASS INDEX DOCD: CPT | Performed by: INTERNAL MEDICINE

## 2024-09-10 ASSESSMENT — PATIENT HEALTH QUESTIONNAIRE - PHQ9
SUM OF ALL RESPONSES TO PHQ9 QUESTIONS 1 AND 2: 0
2. FEELING DOWN, DEPRESSED OR HOPELESS: NOT AT ALL
1. LITTLE INTEREST OR PLEASURE IN DOING THINGS: NOT AT ALL

## 2024-09-10 NOTE — PROGRESS NOTES
Primary care office Note      Name: Dora Trimble, Age: 64 y.o., Gender: female, MRN: [id number]   Pharmacy:   [facility]/pharmacy #[id number] [facility] [address] [address]  [address] [facility] [address]  Phone: [phone] Fax: [phone]     PCP: Olga Johnson        Subjective:   Chief Complaint   Patient presents with    Follow-up     Bp check  Declined flu shot        Dora Trimble is a 64 y.o. female who presented to the clinic today for follow up     HPI:   Dora Trimble is a 64 y.o. female   BP Follow up   -BP well controlled without medication at this time     Chronic hip pain - would like to try aquatic therapy   -trying to lose weight prior to hip replacement surgery      The patient denies headaches, lightheadedness, changes in speech/vision, photophobia, dysphagia, diaphoresis, Chest pain, dyspnea, Abdominal pain, recent falls or trauma, and changes in bowel/urinary habits.     ROS:   12 points review of system is negative excepted as stated above in the HPI.    Medical History      PMH:    has a past medical history of Acute upper respiratory infection, unspecified (01/28/2016), Body mass index (BMI)40.0-44.9, adult (01/31/2020), Cellulitis of left lower limb (01/03/2014), Encounter for gynecological examination (general) (routine) without abnormal findings (12/11/2013), Other bursitis of hip, left hip (12/11/2013), Pain in throat (05/06/2015), Pain in unspecified hip (12/11/2013), Personal history of other diseases of the nervous system and sense organs (05/19/2014), Personal history of other diseases of the respiratory system (07/23/2014), Personal history of other endocrine, nutritional and metabolic disease, Personal history of other infectious and parasitic diseases (05/19/2014), Personal history of other specified conditions (05/06/2015), and Rash and other nonspecific skin eruption (01/03/2014).   Allergies:   Allergies   Allergen Reactions    Bee Pollen Unknown    Cat Dander  Unknown    Shellfish Containing Products Unknown      Surgical Hx:   Past Surgical History:   Procedure Laterality Date    COLONOSCOPY  12/10/2013    Complete Colonoscopy    OTHER SURGICAL HISTORY  12/11/2013    Thyroid Surgery Sub-Total Thyroidectomy    OTHER SURGICAL HISTORY  12/10/2013    Nerve Block Transforaminal Epidural Lumbar      Social HX:   Social History     Tobacco Use    Smoking status: Never     Passive exposure: Never    Smokeless tobacco: Never   Substance Use Topics    Alcohol use: Not Currently     Comment: occasionally    Drug use: Never        MEDS:   Current Outpatient Medications   Medication Instructions    cetirizine (ZYRTEC) 10 mg, oral, Daily    cholecalciferol (Vitamin D-3) 50 MCG (2000 UT) tablet 1 tablet, oral, Daily    cholecalciferol (VITAMIN D-3) 50,000 Units, oral, Once Weekly    fluticasone (Flonase) 50 mcg/actuation nasal spray 1 spray, Each Nostril, Daily, Shake gently. Before first use, prime pump. After use, clean tip and replace cap.    levothyroxine (SYNTHROID, LEVOXYL) 100 mcg, oral, Daily    meloxicam (MOBIC) 15 mg, oral, Daily        Objective Data     Objective:   Visit Vitals  /80 (BP Location: Right arm, Patient Position: Sitting, BP Cuff Size: Large adult)   Pulse 60        Physical Examination:   GE; sitting comfortably in a chair, in NAD  HEENT; AT/NC, no conjunctival pallor, anicteric sclera  Neck; Supple neck, no LAD/JVD  Chest; CTAbl, no adventitious sounds  CVS; s1 and s2 only no MGR, RRR  Abd; soft, NT/ND, normoactive BS  Ext; no cyanosis/clubbing/edema, pulses intact  Neuro; Aox3  Psych; normal mood     Last Labs:   CBC:   WBC   Date Value Ref Range Status   09/03/2024 6.3 4.4 - 11.3 x10*3/uL Final   01/26/2023 6.5 4.4 - 11.3 x10E9/L Final   05/03/2021 7.7 4.4 - 11.3 x10E9/L Final     Hemoglobin   Date Value Ref Range Status   09/03/2024 13.7 12.0 - 16.0 g/dL Final   01/26/2023 13.2 12.0 - 16.0 g/dL Final   05/03/2021 13.7 12.0 - 16.0 g/dL Final     MCV    Date Value Ref Range Status   09/03/2024 90 80 - 100 fL Final   01/26/2023 88 80 - 100 fL Final   05/03/2021 89 80 - 100 fL Final     Platelets   Date Value Ref Range Status   09/03/2024 329 150 - 450 x10*3/uL Final   01/26/2023 367 150 - 450 x10E9/L Final   05/03/2021 370 150 - 450 x10E9/L Final      CMP:   Sodium   Date Value Ref Range Status   09/03/2024 141 136 - 145 mmol/L Final   01/26/2023 141 136 - 145 mmol/L Final   05/03/2021 141 136 - 145 mmol/L Final     Potassium   Date Value Ref Range Status   09/03/2024 4.3 3.5 - 5.3 mmol/L Final   01/26/2023 4.4 3.5 - 5.3 mmol/L Final   05/03/2021 4.3 3.5 - 5.3 mmol/L Final     Chloride   Date Value Ref Range Status   09/03/2024 102 98 - 107 mmol/L Final   01/26/2023 105 98 - 107 mmol/L Final   05/03/2021 104 98 - 107 mmol/L Final     Urea Nitrogen   Date Value Ref Range Status   09/03/2024 15 6 - 23 mg/dL Final   01/26/2023 18 6 - 23 mg/dL Final   05/03/2021 19 6 - 23 mg/dL Final     Creatinine   Date Value Ref Range Status   09/03/2024 0.78 0.50 - 1.05 mg/dL Final   01/26/2023 0.84 0.50 - 1.05 mg/dL Final   05/03/2021 0.76 0.50 - 1.05 mg/dL Final     eGFR   Date Value Ref Range Status   09/03/2024 85 >60 mL/min/1.73m*2 Final     Comment:     Calculations of estimated GFR are performed using the 2021 CKD-EPI Study Refit equation without the race variable for the IDMS-Traceable creatinine methods.  https://jasn.asnjournals.org/content/early/2021/09/22/ASN.2873697082      A1c:   Hemoglobin A1C   Date Value Ref Range Status   09/03/2024 5.8 (H) see below % Final   09/12/2023 5.6 % Final     Comment:          Diagnosis of Diabetes-Adults   Non-Diabetic: < or = 5.6%   Increased risk for developing diabetes: 5.7-6.4%   Diagnostic of diabetes: > or = 6.5%  .       Monitoring of Diabetes                Age (y)     Therapeutic Goal (%)   Adults:          >18           <7.0   Pediatrics:    13-18           <7.5                   7-12           <8.0                   0- 6             7.5-8.5   American Diabetes Association. Diabetes Care 33(S1), Jan 2010.   01/26/2023 5.7 (A) % Final     Comment:          Diagnosis of Diabetes-Adults   Non-Diabetic: < or = 5.6%   Increased risk for developing diabetes: 5.7-6.4%   Diagnostic of diabetes: > or = 6.5%  .       Monitoring of Diabetes                Age (y)     Therapeutic Goal (%)   Adults:          >18           <7.0   Pediatrics:    13-18           <7.5                   7-12           <8.0                   0- 6            7.5-8.5   American Diabetes Association. Diabetes Care 33(S1), Jan 2010.          Other labs;   Vit D:   Vitamin D, 25-Hydroxy, Total   Date Value Ref Range Status   09/03/2024 29 (L) 30 - 100 ng/mL Final     Vitamin D, 25-Hydroxy   Date Value Ref Range Status   09/12/2023 20 (A) ng/mL Final     Comment:     .  DEFICIENCY:         < 20   NG/ML  INSUFFICIENCY:      20-29  NG/ML  SUFFICIENCY:         NG/ML    THIS ASSAY ACCURATELY QUANTIFIES THE SUM OF  VITAMIN D3, 25-HYDROXY AND VIT D2,25-HYDROXY.   01/26/2023 18 (A) ng/mL Final     Comment:     .  DEFICIENCY:         < 20   NG/ML  INSUFFICIENCY:      20-29  NG/ML  SUFFICIENCY:         NG/ML    THIS ASSAY ACCURATELY QUANTIFIES THE SUM OF  VITAMIN D3, 25-HYDROXY AND VIT D2,25-HYDROXY.        TSH:  Thyroid Stimulating Hormone   Date Value Ref Range Status   09/03/2024 1.50 0.44 - 3.98 mIU/L Final     TSH   Date Value Ref Range Status   09/12/2023 1.55 0.44 - 3.98 mIU/L Final     Comment:      TSH testing is performed using different testing    methodology at [facility] than at other    [facility]. Direct result comparisons should    only be made within the same method.   01/26/2023 1.63 0.44 - 3.98 mIU/L Final     Comment:      TSH testing is performed using different testing    methodology at [facility] than at other    [facility]. Direct result comparisons should    only be made within the same method.           Assessment and Plan     Problem List Items Addressed This Visit       Osteoarthritis of hip - Primary    Relevant Orders    Referral to Physical Therapy       Olga Johnson DO

## 2025-03-03 PROBLEM — M16.12 UNILATERAL PRIMARY OSTEOARTHRITIS, LEFT HIP: Status: ACTIVE | Noted: 2025-03-02

## 2025-03-06 ENCOUNTER — APPOINTMENT (OUTPATIENT)
Dept: PRIMARY CARE | Facility: CLINIC | Age: 65
End: 2025-03-06
Payer: COMMERCIAL

## 2025-03-12 ENCOUNTER — APPOINTMENT (OUTPATIENT)
Dept: PRIMARY CARE | Facility: CLINIC | Age: 65
End: 2025-03-12

## 2025-03-12 ENCOUNTER — OFFICE VISIT (OUTPATIENT)
Dept: PRIMARY CARE | Facility: CLINIC | Age: 65
End: 2025-03-12
Payer: COMMERCIAL

## 2025-03-12 VITALS
WEIGHT: 245 LBS | SYSTOLIC BLOOD PRESSURE: 129 MMHG | BODY MASS INDEX: 40.82 KG/M2 | DIASTOLIC BLOOD PRESSURE: 70 MMHG | HEIGHT: 65 IN

## 2025-03-12 DIAGNOSIS — R73.03 PREDIABETES: ICD-10-CM

## 2025-03-12 DIAGNOSIS — E78.2 MODERATE MIXED HYPERLIPIDEMIA NOT REQUIRING STATIN THERAPY: Primary | ICD-10-CM

## 2025-03-12 DIAGNOSIS — Z12.31 ENCOUNTER FOR SCREENING MAMMOGRAM FOR MALIGNANT NEOPLASM OF BREAST: ICD-10-CM

## 2025-03-12 DIAGNOSIS — E03.9 HYPOTHYROIDISM, UNSPECIFIED TYPE: ICD-10-CM

## 2025-03-12 PROCEDURE — 99214 OFFICE O/P EST MOD 30 MIN: CPT | Performed by: INTERNAL MEDICINE

## 2025-03-12 PROCEDURE — 3008F BODY MASS INDEX DOCD: CPT | Performed by: INTERNAL MEDICINE

## 2025-03-12 RX ORDER — LEVOTHYROXINE SODIUM 100 UG/1
100 TABLET ORAL DAILY
Qty: 90 TABLET | Refills: 1 | Status: SHIPPED | OUTPATIENT
Start: 2025-03-12

## 2025-03-12 ASSESSMENT — LIFESTYLE VARIABLES: HOW OFTEN DO YOU HAVE A DRINK CONTAINING ALCOHOL: MONTHLY OR LESS

## 2025-03-12 NOTE — PROGRESS NOTES
Primary care office Note      Name: Dora Trimble, Age: 64 y.o., Gender: female, MRN: [id number]   Pharmacy:   [facility]/pharmacy #[id number] [facility] [address] [address] [address]  Phone: [phone] Fax: [phone]     PCP: Olga Johnson        Subjective:   Chief Complaint   Patient presents with    Follow-up        Dora Trimble is a 64 y.o. female who presented to the clinic today for follow up.     HPI:   Dora Trimble is a 64 y.o. female  Patient is feeling well.  Patient is discouraged as she was trying to lose weight prior to her hip replacement surgery.  Patient is attempted diet and exercise changes without success.  Patient would like to try medication to help with this.    The patient denies headaches, lightheadedness, changes in speech/vision, photophobia, dysphagia, diaphoresis, Chest pain, dyspnea, Abdominal pain, recent falls or trauma, and changes in bowel/urinary habits.     ROS:   12 points review of system is negative excepted as stated above in the HPI.    Medical History      PMH:    has a past medical history of Acute upper respiratory infection, unspecified (01/28/2016), Body mass index (BMI)40.0-44.9, adult (01/31/2020), Cellulitis of left lower limb (01/03/2014), Encounter for gynecological examination (general) (routine) without abnormal findings (12/11/2013), Other bursitis of hip, left hip (12/11/2013), Pain in throat (05/06/2015), Pain in unspecified hip (12/11/2013), Personal history of other diseases of the nervous system and sense organs (05/19/2014), Personal history of other diseases of the respiratory system (07/23/2014), Personal history of other endocrine, nutritional and metabolic disease, Personal history of other infectious and parasitic diseases (05/19/2014), Personal history of other specified conditions (05/06/2015), and Rash and other nonspecific skin eruption (01/03/2014).   Allergies:   Allergies   Allergen Reactions    Bee Pollen  Unknown    Cat Dander Unknown    Shellfish Containing Products Unknown      Surgical Hx:   Past Surgical History:   Procedure Laterality Date    COLONOSCOPY  12/10/2013    Complete Colonoscopy    OTHER SURGICAL HISTORY  12/11/2013    Thyroid Surgery Sub-Total Thyroidectomy    OTHER SURGICAL HISTORY  12/10/2013    Nerve Block Transforaminal Epidural Lumbar      Social HX:   Social History     Tobacco Use    Smoking status: Never    Smokeless tobacco: Never   Substance Use Topics    Alcohol use: Not Currently     Comment: occasionally    Drug use: Never        MEDS:   Current Outpatient Medications   Medication Instructions    cetirizine (ZYRTEC) 10 mg, oral, Daily    cholecalciferol (Vitamin D-3) 50 MCG (2000 UT) tablet 1 tablet, oral, Daily    fluticasone (Flonase) 50 mcg/actuation nasal spray 1 spray, Each Nostril, Daily, Shake gently. Before first use, prime pump. After use, clean tip and replace cap.    levothyroxine (SYNTHROID, LEVOXYL) 100 mcg, oral, Daily        Objective Data     Objective:   Visit Vitals  /70        Physical Examination:   GE; sitting comfortably in a chair, in NAD  HEENT; AT/NC, no conjunctival pallor, anicteric sclera  Neck; Supple neck, no LAD/JVD  Chest; CTAbl, no adventitious sounds  CVS; s1 and s2 only no MGR, RRR  Ext; no cyanosis/clubbing/edema, pulses intact  Neuro; Aox3  Psych; normal mood     Last Labs:   CBC:   WBC   Date Value Ref Range Status   09/03/2024 6.3 4.4 - 11.3 x10*3/uL Final   01/26/2023 6.5 4.4 - 11.3 x10E9/L Final   05/03/2021 7.7 4.4 - 11.3 x10E9/L Final     Hemoglobin   Date Value Ref Range Status   09/03/2024 13.7 12.0 - 16.0 g/dL Final   01/26/2023 13.2 12.0 - 16.0 g/dL Final   05/03/2021 13.7 12.0 - 16.0 g/dL Final     MCV   Date Value Ref Range Status   09/03/2024 90 80 - 100 fL Final   01/26/2023 88 80 - 100 fL Final   05/03/2021 89 80 - 100 fL Final     Platelets   Date Value Ref Range Status   09/03/2024 329 150 - 450 x10*3/uL Final   01/26/2023 367  150 - 450 x10E9/L Final   05/03/2021 370 150 - 450 x10E9/L Final      CMP:   Sodium   Date Value Ref Range Status   09/03/2024 141 136 - 145 mmol/L Final   01/26/2023 141 136 - 145 mmol/L Final   05/03/2021 141 136 - 145 mmol/L Final     Potassium   Date Value Ref Range Status   09/03/2024 4.3 3.5 - 5.3 mmol/L Final   01/26/2023 4.4 3.5 - 5.3 mmol/L Final   05/03/2021 4.3 3.5 - 5.3 mmol/L Final     Chloride   Date Value Ref Range Status   09/03/2024 102 98 - 107 mmol/L Final   01/26/2023 105 98 - 107 mmol/L Final   05/03/2021 104 98 - 107 mmol/L Final     Urea Nitrogen   Date Value Ref Range Status   09/03/2024 15 6 - 23 mg/dL Final   01/26/2023 18 6 - 23 mg/dL Final   05/03/2021 19 6 - 23 mg/dL Final     Creatinine   Date Value Ref Range Status   09/03/2024 0.78 0.50 - 1.05 mg/dL Final   01/26/2023 0.84 0.50 - 1.05 mg/dL Final   05/03/2021 0.76 0.50 - 1.05 mg/dL Final     eGFR   Date Value Ref Range Status   09/03/2024 85 >60 mL/min/1.73m*2 Final     Comment:     Calculations of estimated GFR are performed using the 2021 CKD-EPI Study Refit equation without the race variable for the IDMS-Traceable creatinine methods.  https://jasn.asnjournals.org/content/early/2021/09/22/ASN.8673493660      A1c:   Hemoglobin A1C   Date Value Ref Range Status   09/03/2024 5.8 (H) see below % Final   09/12/2023 5.6 % Final     Comment:          Diagnosis of Diabetes-Adults   Non-Diabetic: < or = 5.6%   Increased risk for developing diabetes: 5.7-6.4%   Diagnostic of diabetes: > or = 6.5%  .       Monitoring of Diabetes                Age (y)     Therapeutic Goal (%)   Adults:          >18           <7.0   Pediatrics:    13-18           <7.5                   7-12           <8.0                   0- 6            7.5-8.5   American Diabetes Association. Diabetes Care 33(S1), Jan 2010.   01/26/2023 5.7 (A) % Final     Comment:          Diagnosis of Diabetes-Adults   Non-Diabetic: < or = 5.6%   Increased risk for developing diabetes:  5.7-6.4%   Diagnostic of diabetes: > or = 6.5%  .       Monitoring of Diabetes                Age (y)     Therapeutic Goal (%)   Adults:          >18           <7.0   Pediatrics:    13-18           <7.5                   7-12           <8.0                   0- 6            7.5-8.5   American Diabetes Association. Diabetes Care 33(S1), Jan 2010.          Other labs;   Vit D:   Vitamin D, 25-Hydroxy, Total   Date Value Ref Range Status   09/03/2024 29 (L) 30 - 100 ng/mL Final     Vitamin D, 25-Hydroxy   Date Value Ref Range Status   09/12/2023 20 (A) ng/mL Final     Comment:     .  DEFICIENCY:         < 20   NG/ML  INSUFFICIENCY:      20-29  NG/ML  SUFFICIENCY:         NG/ML    THIS ASSAY ACCURATELY QUANTIFIES THE SUM OF  VITAMIN D3, 25-HYDROXY AND VIT D2,25-HYDROXY.   01/26/2023 18 (A) ng/mL Final     Comment:     .  DEFICIENCY:         < 20   NG/ML  INSUFFICIENCY:      20-29  NG/ML  SUFFICIENCY:         NG/ML    THIS ASSAY ACCURATELY QUANTIFIES THE SUM OF  VITAMIN D3, 25-HYDROXY AND VIT D2,25-HYDROXY.        TSH:  TSH   Date Value Ref Range Status   09/12/2023 1.55 0.44 - 3.98 mIU/L Final     Comment:      TSH testing is performed using different testing    methodology at [facility] than at other    [facility]. Direct result comparisons should    only be made within the same method.   01/26/2023 1.63 0.44 - 3.98 mIU/L Final     Comment:      TSH testing is performed using different testing    methodology at [facility] than at other    [facility]. Direct result comparisons should    only be made within the same method.       Thyroid Stimulating Hormone   Date Value Ref Range Status   09/03/2024 1.50 0.44 - 3.98 mIU/L Final        Assessment and Plan     Problem List Items Addressed This Visit       Hyperlipidemia - Primary    Relevant Orders    Referral to Clinical Pharmacy    Hypothyroidism    Relevant Medications    levothyroxine (Synthroid, Levoxyl) 100 mcg  tablet     Other Visit Diagnoses       Prediabetes        Relevant Orders    Referral to Clinical Pharmacy    BMI 40.0-44.9, adult (Multi)        Relevant Orders    Referral to Clinical Pharmacy    Encounter for screening mammogram for malignant neoplasm of breast        Relevant Orders    BI mammo bilateral screening tomosynthesis            Olga Johnson DO

## 2025-03-13 ENCOUNTER — OFFICE VISIT (OUTPATIENT)
Dept: ORTHOPEDIC SURGERY | Facility: CLINIC | Age: 65
End: 2025-03-13
Payer: COMMERCIAL

## 2025-03-13 VITALS — WEIGHT: 241 LBS | BODY MASS INDEX: 40.15 KG/M2 | HEIGHT: 65 IN

## 2025-03-13 DIAGNOSIS — M16.12 PRIMARY OSTEOARTHRITIS OF LEFT HIP: Primary | ICD-10-CM

## 2025-03-13 PROCEDURE — 99214 OFFICE O/P EST MOD 30 MIN: CPT | Performed by: ORTHOPAEDIC SURGERY

## 2025-03-13 PROCEDURE — 1036F TOBACCO NON-USER: CPT | Performed by: ORTHOPAEDIC SURGERY

## 2025-03-13 PROCEDURE — 3008F BODY MASS INDEX DOCD: CPT | Performed by: ORTHOPAEDIC SURGERY

## 2025-03-13 NOTE — PROGRESS NOTES
Patient is a 64-year-old female presents today for discussion of upcoming left total hip arthroplasty having decided to proceed with surgery.  Her BMI is currently 40.  She tried anti-inflammatories and home exercise program.  She has difficulty walking sort of distance or putting on her shoes and socks.  She rates her pain at times is 9 out of 10.  She is very fed up with her quality of life and would like to proceed with total hip arthroplasty which is indicated at this time.    Left hip:    AAOx3, NAD, walks with a moderate antalgic gait  Significant pain with flexion internal rotation  Positive Stinchfield  Mild TTP over trochanter laterally  5/5 Hip flexion/knee extension/df/pf/ehl  SILT in a jf/saph/per/tib distribution  ½ dorsalis pedis and posterior tibial pulse  no popliteal or inguinal lymphadenopathy  no other overlying lesions  mood: euthymic  Respirations non    Plain films were reviewed by myself in clinic today.  They have advanced osteoarthritis of their hip with significant joint space narrowing, bone on bone changes, underlying sclerosis and bipolar osteophytic change.    Patient is now failed a greater than 3-month trial of reasonable conservative treatment and wishes to proceed with total hip arthroplasty which is indicated at this time.  We discussed the risk benefits alternatives to surgery.  All of their questions were answered.  Her BMI would have to be below 40 before proceeding with surgery.    Procedure: left total hip  Location: [facility]  ICD10: M16.12  CPT: 23352  Stay: outpatient  Equipment: [employer]    I talked with the patient at length about risks, limitations, benefits and alternatives to total hip replacement today. I reviewed concerns about implant wear, loosening, breakage, infection, infection prophylaxis, DVT, PE, death and other medical and anesthetic complications of surgery. We talked about the potential for persistent pain following surgery since there are many  possible causes for hip and leg pain. The patient was advised that hip replacement will only relieve pain that is coming from the hip. We talked about leg length discrepancy, neurovascular problems and dislocation after surgery. The patient understands that we may have to lengthen the leg slightly to provide for adequate stability of the hip. I reviewed dislocation precautions and activity restrictions in detail. We discussed the concerns about intraoperative fracture, ingrowth failure, thigh pain and possible post-operative weight bearing restrictions following cementless hip replacement. We discussed advantages and disadvantages of different surgical approaches. We discussed the possible need for a homologous blood transfusion. We discussed the fact that many of our patients are able to go home in 1 day or less depending on their health, mobility, pre-op preparation, individual home situation and personal preference. The patient has identified their personal goals of their joint replacement surgery and recovery and we have discussed them. These are documented in our [employer] patient engagement platform. In addition, we have discussed the advantages and disadvantages of various implant and fixation options, as well as various surgical approaches.  The basic concepts of the joint replacement procedure has been reviewed with the patient and the patient has been provided the opportunity to see an actual implant either in the office or in our pre-op education class. All of the patients questions were answered. The patient can call my office to schedule surgery and the pre-op teaching class. I told the patient that they should contact their primary care physician to discuss fitness for surgery.     This note was created using voice recognition software and was not corrected for typographical or grammatical errors.

## 2025-03-19 ENCOUNTER — TELEPHONE (OUTPATIENT)
Dept: PRIMARY CARE | Facility: CLINIC | Age: 65
End: 2025-03-19
Payer: COMMERCIAL

## 2025-03-19 NOTE — TELEPHONE ENCOUNTER
Dora called to let you know that she is scheduled on 3/28/2025 with the pharmacy to discuss weight loss medication.  She wants a sooner appointment and she stated that you wanted her to call to see if we could help with that.

## 2025-03-20 ENCOUNTER — PATIENT OUTREACH (OUTPATIENT)
Dept: CARE COORDINATION | Facility: CLINIC | Age: 65
End: 2025-03-20
Payer: COMMERCIAL

## 2025-03-20 NOTE — PROGRESS NOTES
Outreach call in regard to nutrition referral. [facility] with contact number [phone] if Dora would like to call back and schedule an appointment.

## 2025-03-20 NOTE — PROGRESS NOTES
Clinical Pharmacy Appointment    Patient ID: Dora Trimble is a 64 y.o. female who presents for Weight Loss.    Pt is here for First appointment.     Referring Provider: Olga Johnson DO  PCP: Olga Johnson DO   Last visit with PCP: 3/12/25   Next visit with PCP: 6/18/25    Subjective   Medication Reconciliation:  Changed: None  Added: None  Discontinued: Flonase, Vitamin D-3, Zyrtec     Drug Interactions  No relevant drug interactions were noted.    Medication System Management  [facility]'s preferred [facility]: [facility]  Adherence/Organization: No concerns   Affordability/Accessibility: No concerns       Interval History  N/A    HPI  Weight Loss  Current Weight: 241 lb  Current BMI: 40.10 kg/m2    Comorbid Conditions:  HTN: No  Cholesterol: Yes  JEN: No  The 10-year ASCVD risk score (Charlotte RANDHAWA, et al., 2019) is: 7.4%    Values used to calculate the score:      Age: 64 years      Sex: Female      Is Non- : Yes      Diabetic: No      Tobacco smoker: No      Systolic Blood Pressure: 129 mmHg      Is BP treated: No      HDL Cholesterol: 74.5 mg/dL      Total Cholesterol: 218 mg/dL       Objective   Allergies   Allergen Reactions    Bee Pollen Unknown    Cat Dander Unknown    Shellfish Containing Products Unknown     Social History     Social History Narrative    Not on file      Medication Review  Current Outpatient Medications   Medication Instructions    cetirizine (ZYRTEC) 10 mg, oral, Daily    cholecalciferol (Vitamin D-3) 50 MCG (2000 UT) tablet 1 tablet, Daily    fluticasone (Flonase) 50 mcg/actuation nasal spray 1 spray, Each Nostril, Daily, Shake gently. Before first use, prime pump. After use, clean tip and replace cap.    levothyroxine (SYNTHROID, LEVOXYL) 100 mcg, oral, Daily      Vitals  BP Readings from Last 2 Encounters:   03/12/25 129/70   09/10/24 130/80     BMI Readings from Last 1 Encounters:   03/13/25 40.10 kg/m²      Labs  A1C  Lab Results   Component Value Date    HGBA1C  "5.8 (H) 09/03/2024    HGBA1C 5.6 09/12/2023    HGBA1C 5.7 (A) 01/26/2023     BMP  Lab Results   Component Value Date    CALCIUM 8.3 (L) 09/03/2024     09/03/2024    K 4.3 09/03/2024    CO2 29 09/03/2024     09/03/2024    BUN 15 09/03/2024    CREATININE 0.78 09/03/2024    EGFR 85 09/03/2024     LFTs  Lab Results   Component Value Date    ALT 11 09/03/2024    AST 14 09/03/2024    ALKPHOS 59 09/03/2024    BILITOT 0.4 09/03/2024     FLP  Lab Results   Component Value Date    TRIG 84 09/03/2024    CHOL 218 (H) 09/03/2024    LDLF 125 (H) 09/12/2023    LDLCALC 127 (H) 09/03/2024    HDL 74.5 09/03/2024     Urine Microalbumin  No results found for: \"MICROALBCREA\"  Weight Management  Wt Readings from Last 3 Encounters:   03/13/25 109 kg (241 lb)   03/12/25 111 kg (245 lb)   09/10/24 113 kg (250 lb)      There is no height or weight on file to calculate BMI.     Assessment/Plan   Problem List Items Addressed This Visit       Hyperlipidemia    Relevant Orders    Referral to Clinical Pharmacy    Obesity - Primary     Patient and I met today to discuss weight loss options. Patient reports that she is scheduled to receive a hip replacement but surgeon informed her that if she does not lose weight she will have to reschedule surgery. After doing test bills through insurance, I was able to inform patient that her insurance does not cover any weight loss medications. Patient mentioned that she is switching insurances in 10 days and would like to discuss weight loss options again once new insurance is active.     Also, discussed receiving Zepbound vials directly through  program which is starting at $349 per month but patient reports that she unable to afford it.           Other Visit Diagnoses       Prediabetes        Relevant Orders    Referral to Clinical Pharmacy    BMI 40.0-44.9, adult (Multi)        Relevant Orders    Referral to Clinical Pharmacy            Clinical Pharmacist follow-up: 4/2/25 @ 9 AM, " Telehealth visit    Continue all meds under the continuation of care with the referring provider and clinical pharmacy team.    Orin Duron PharmD  PGY-1 Pharmacy Resident     Verbal consent to manage patient's drug therapy was obtained from the patient. They were informed they may decline to participate or withdraw from participation in pharmacy services at any time.

## 2025-03-21 ENCOUNTER — TELEMEDICINE (OUTPATIENT)
Dept: PHARMACY | Facility: HOSPITAL | Age: 65
End: 2025-03-21
Payer: COMMERCIAL

## 2025-03-21 DIAGNOSIS — E78.2 MODERATE MIXED HYPERLIPIDEMIA NOT REQUIRING STATIN THERAPY: ICD-10-CM

## 2025-03-21 DIAGNOSIS — R73.03 PREDIABETES: ICD-10-CM

## 2025-03-21 DIAGNOSIS — E66.813 CLASS 3 SEVERE OBESITY WITH BODY MASS INDEX (BMI) OF 40.0 TO 44.9 IN ADULT, UNSPECIFIED OBESITY TYPE, UNSPECIFIED WHETHER SERIOUS COMORBIDITY PRESENT: Primary | ICD-10-CM

## 2025-03-21 DIAGNOSIS — E66.01 CLASS 3 SEVERE OBESITY WITH BODY MASS INDEX (BMI) OF 40.0 TO 44.9 IN ADULT, UNSPECIFIED OBESITY TYPE, UNSPECIFIED WHETHER SERIOUS COMORBIDITY PRESENT: Primary | ICD-10-CM

## 2025-03-21 NOTE — PROGRESS NOTES
I reviewed the progress note and agree with the resident’s findings and plans as written. Case discussed with resident.    Michelle Payan, PharmD

## 2025-03-27 ENCOUNTER — PATIENT MESSAGE (OUTPATIENT)
Dept: CARE COORDINATION | Facility: CLINIC | Age: 65
End: 2025-03-27
Payer: COMMERCIAL

## 2025-03-28 ENCOUNTER — APPOINTMENT (OUTPATIENT)
Dept: PHARMACY | Facility: HOSPITAL | Age: 65
End: 2025-03-28
Payer: COMMERCIAL

## 2025-04-01 NOTE — PROGRESS NOTES
Clinical Pharmacy Appointment    Patient ID: Dora Trimble is a 64 y.o. female who presents for Weight Loss.    Pt is here for Follow Up appointment.     Referring Provider: Olga Johnson DO  PCP: Olga Johnson DO   Last visit with PCP: 3/12/25   Next visit with PCP: 6/18/25    Subjective     Drug Interactions  No relevant drug interactions were noted.    Medication System Management  [facility]  Adherence/Organization: No concerns   Affordability/Accessibility: No concerns       Interval History  Patient and I met today to discuss weight loss options. Patient reports that she is scheduled to receive a hip replacement but surgeon informed her that if she does not lose weight she will have to reschedule surgery. After doing test bills through insurance, I was able to inform patient that her insurance does not cover any weight loss medications. Patient mentioned that she is switching insurances in 10 days and would like to discuss weight loss options again once new insurance is active.     HPI  Weight Loss  Current Weight: 241 lb  Current BMI: 40.10 kg/m2    Comorbid Conditions:  HTN: No  Cholesterol: Yes  JEN: No  The 10-year ASCVD risk score (Charlotte DK, et al., 2019) is: 7.4%    Values used to calculate the score:      Age: 64 years      Sex: Female      Is Non- : Yes      Diabetic: No      Tobacco smoker: No      Systolic Blood Pressure: 129 mmHg      Is BP treated: No      HDL Cholesterol: 74.5 mg/dL      Total Cholesterol: 218 mg/dL       Objective   Allergies   Allergen Reactions    Bee Pollen Unknown    Cat Dander Unknown    Shellfish Containing Products Unknown     Social History     Social History Narrative    Not on file      Medication Review  Current Outpatient Medications   Medication Instructions    cetirizine (ZYRTEC) 10 mg, oral, Daily    cholecalciferol (Vitamin D-3) 50 MCG (2000 UT) tablet 1 tablet, Daily    fluticasone (Flonase) 50  "mcg/actuation nasal spray 1 spray, Each Nostril, Daily, Shake gently. Before first use, prime pump. After use, clean tip and replace cap.    levothyroxine (SYNTHROID, LEVOXYL) 100 mcg, oral, Daily      Vitals  BP Readings from Last 2 Encounters:   03/12/25 129/70   09/10/24 130/80     BMI Readings from Last 1 Encounters:   03/13/25 40.10 kg/m²      Labs  A1C  Lab Results   Component Value Date    HGBA1C 5.8 (H) 09/03/2024    HGBA1C 5.6 09/12/2023    HGBA1C 5.7 (A) 01/26/2023     BMP  Lab Results   Component Value Date    CALCIUM 8.3 (L) 09/03/2024     09/03/2024    K 4.3 09/03/2024    CO2 29 09/03/2024     09/03/2024    BUN 15 09/03/2024    CREATININE 0.78 09/03/2024    EGFR 85 09/03/2024     LFTs  Lab Results   Component Value Date    ALT 11 09/03/2024    AST 14 09/03/2024    ALKPHOS 59 09/03/2024    BILITOT 0.4 09/03/2024     FLP  Lab Results   Component Value Date    TRIG 84 09/03/2024    CHOL 218 (H) 09/03/2024    LDLF 125 (H) 09/12/2023    LDLCALC 127 (H) 09/03/2024    HDL 74.5 09/03/2024     Urine Microalbumin  No results found for: \"MICROALBCREA\"  Weight Management  Wt Readings from Last 3 Encounters:   03/13/25 109 kg (241 lb)   03/12/25 111 kg (245 lb)   09/10/24 113 kg (250 lb)      There is no height or weight on file to calculate BMI.     Assessment/Plan   Problem List Items Addressed This Visit       Hyperlipidemia    Obesity - Primary     I met with the patient today to discuss weight loss options under their new insurance. After running test claims, I informed the patient that their insurance does not cover weight loss medications. Also, she was informed that the provider has submitted a nutrition referral, which could support her weight loss efforts.    Discussed receiving Zepbound vials directly through  program which is starting at $349 per month but patient reports that she unable to afford it.           Other Visit Diagnoses       Prediabetes        BMI 40.0-44.9, adult " (Multi)              Clinical Pharmacist follow-up: as requested per patient     Continue all meds under the continuation of care with the referring provider and clinical pharmacy team.    Orin Duron PharmD  PGY-1 Pharmacy Resident     Verbal consent to manage patient's drug therapy was obtained from the patient. They were informed they may decline to participate or withdraw from participation in pharmacy services at any time.

## 2025-04-02 ENCOUNTER — APPOINTMENT (OUTPATIENT)
Dept: PHARMACY | Facility: HOSPITAL | Age: 65
End: 2025-04-02
Payer: COMMERCIAL

## 2025-04-02 DIAGNOSIS — R73.03 PREDIABETES: ICD-10-CM

## 2025-04-02 DIAGNOSIS — E78.2 MODERATE MIXED HYPERLIPIDEMIA NOT REQUIRING STATIN THERAPY: ICD-10-CM

## 2025-04-02 DIAGNOSIS — E66.813 CLASS 3 SEVERE OBESITY WITH BODY MASS INDEX (BMI) OF 40.0 TO 44.9 IN ADULT, UNSPECIFIED OBESITY TYPE, UNSPECIFIED WHETHER SERIOUS COMORBIDITY PRESENT: Primary | ICD-10-CM

## 2025-04-02 DIAGNOSIS — E66.01 CLASS 3 SEVERE OBESITY WITH BODY MASS INDEX (BMI) OF 40.0 TO 44.9 IN ADULT, UNSPECIFIED OBESITY TYPE, UNSPECIFIED WHETHER SERIOUS COMORBIDITY PRESENT: Primary | ICD-10-CM

## 2025-04-02 NOTE — ASSESSMENT & PLAN NOTE
I met with the patient today to discuss weight loss options under their new insurance. After running test claims, I informed the patient that their insurance does not cover weight loss medications. Also, she was informed that the provider has submitted a nutrition referral, which could support her weight loss efforts.    Discussed receiving Zepbound vials directly through  program which is starting at $349 per month but patient reports that she unable to afford it.

## 2025-04-07 ENCOUNTER — PRE-ADMISSION TESTING (OUTPATIENT)
Dept: PREADMISSION TESTING | Facility: HOSPITAL | Age: 65
End: 2025-04-07
Payer: MEDICARE

## 2025-04-07 ENCOUNTER — HOSPITAL ENCOUNTER (OUTPATIENT)
Dept: RADIOLOGY | Facility: HOSPITAL | Age: 65
Discharge: HOME | End: 2025-04-07
Payer: MEDICARE

## 2025-04-07 ENCOUNTER — EDUCATION (OUTPATIENT)
Dept: ORTHOPEDIC SURGERY | Facility: HOSPITAL | Age: 65
End: 2025-04-07
Payer: MEDICARE

## 2025-04-07 ENCOUNTER — LAB (OUTPATIENT)
Dept: LAB | Facility: HOSPITAL | Age: 65
End: 2025-04-07
Payer: MEDICARE

## 2025-04-07 VITALS
RESPIRATION RATE: 16 BRPM | BODY MASS INDEX: 39.17 KG/M2 | OXYGEN SATURATION: 98 % | SYSTOLIC BLOOD PRESSURE: 152 MMHG | WEIGHT: 235.1 LBS | DIASTOLIC BLOOD PRESSURE: 68 MMHG | TEMPERATURE: 98.1 F | HEART RATE: 50 BPM | HEIGHT: 65 IN

## 2025-04-07 DIAGNOSIS — Z01.818 ENCOUNTER FOR OTHER PREPROCEDURAL EXAMINATION: Primary | ICD-10-CM

## 2025-04-07 DIAGNOSIS — M16.12 UNILATERAL PRIMARY OSTEOARTHRITIS, LEFT HIP: Primary | ICD-10-CM

## 2025-04-07 DIAGNOSIS — M16.12 UNILATERAL PRIMARY OSTEOARTHRITIS, LEFT HIP: ICD-10-CM

## 2025-04-07 DIAGNOSIS — Z01.818 PREOP TESTING: ICD-10-CM

## 2025-04-07 DIAGNOSIS — R79.9 ABNORMAL BLOOD CHEMISTRY: ICD-10-CM

## 2025-04-07 LAB
ALBUMIN SERPL BCP-MCNC: 4.2 G/DL (ref 3.4–5)
ALP SERPL-CCNC: 48 U/L (ref 33–136)
ALT SERPL W P-5'-P-CCNC: 9 U/L (ref 7–45)
ANION GAP SERPL CALC-SCNC: 11 MMOL/L (ref 10–20)
AST SERPL W P-5'-P-CCNC: 13 U/L (ref 9–39)
ATRIAL RATE: 48 BPM
BASOPHILS # BLD AUTO: 0.02 X10*3/UL (ref 0–0.1)
BASOPHILS NFR BLD AUTO: 0.3 %
BILIRUB SERPL-MCNC: 0.4 MG/DL (ref 0–1.2)
BUN SERPL-MCNC: 19 MG/DL (ref 6–23)
CALCIUM SERPL-MCNC: 7.9 MG/DL (ref 8.6–10.3)
CHLORIDE SERPL-SCNC: 103 MMOL/L (ref 98–107)
CO2 SERPL-SCNC: 30 MMOL/L (ref 21–32)
CREAT SERPL-MCNC: 0.74 MG/DL (ref 0.5–1.05)
EGFRCR SERPLBLD CKD-EPI 2021: 90 ML/MIN/1.73M*2
EOSINOPHIL # BLD AUTO: 0.16 X10*3/UL (ref 0–0.7)
EOSINOPHIL NFR BLD AUTO: 2.6 %
ERYTHROCYTE [DISTWIDTH] IN BLOOD BY AUTOMATED COUNT: 14.2 % (ref 11.5–14.5)
GLUCOSE SERPL-MCNC: 95 MG/DL (ref 74–99)
HCT VFR BLD AUTO: 41.5 % (ref 36–46)
HGB BLD-MCNC: 12.9 G/DL (ref 12–16)
IMM GRANULOCYTES # BLD AUTO: 0.01 X10*3/UL (ref 0–0.7)
IMM GRANULOCYTES NFR BLD AUTO: 0.2 % (ref 0–0.9)
LYMPHOCYTES # BLD AUTO: 2.41 X10*3/UL (ref 1.2–4.8)
LYMPHOCYTES NFR BLD AUTO: 39.1 %
MCH RBC QN AUTO: 27.2 PG (ref 26–34)
MCHC RBC AUTO-ENTMCNC: 31.1 G/DL (ref 32–36)
MCV RBC AUTO: 87 FL (ref 80–100)
MONOCYTES # BLD AUTO: 0.41 X10*3/UL (ref 0.1–1)
MONOCYTES NFR BLD AUTO: 6.6 %
NEUTROPHILS # BLD AUTO: 3.16 X10*3/UL (ref 1.2–7.7)
NEUTROPHILS NFR BLD AUTO: 51.2 %
NRBC BLD-RTO: ABNORMAL /100{WBCS}
P AXIS: 69 DEGREES
P OFFSET: 199 MS
P ONSET: 146 MS
PLATELET # BLD AUTO: 327 X10*3/UL (ref 150–450)
POTASSIUM SERPL-SCNC: 3.9 MMOL/L (ref 3.5–5.3)
PR INTERVAL: 146 MS
PROT SERPL-MCNC: 7.2 G/DL (ref 6.4–8.2)
Q ONSET: 219 MS
QRS COUNT: 8 BEATS
QRS DURATION: 76 MS
QT INTERVAL: 458 MS
QTC CALCULATION(BAZETT): 409 MS
QTC FREDERICIA: 425 MS
R AXIS: 26 DEGREES
RBC # BLD AUTO: 4.75 X10*6/UL (ref 4–5.2)
SODIUM SERPL-SCNC: 140 MMOL/L (ref 136–145)
T AXIS: 77 DEGREES
T OFFSET: 448 MS
VENTRICULAR RATE: 48 BPM
WBC # BLD AUTO: 6.2 X10*3/UL (ref 4.4–11.3)

## 2025-04-07 PROCEDURE — 93010 ELECTROCARDIOGRAM REPORT: CPT | Performed by: INTERNAL MEDICINE

## 2025-04-07 PROCEDURE — 80053 COMPREHEN METABOLIC PANEL: CPT

## 2025-04-07 PROCEDURE — 73502 X-RAY EXAM HIP UNI 2-3 VIEWS: CPT | Mod: LEFT SIDE | Performed by: RADIOLOGY

## 2025-04-07 PROCEDURE — 99204 OFFICE O/P NEW MOD 45 MIN: CPT

## 2025-04-07 PROCEDURE — 83036 HEMOGLOBIN GLYCOSYLATED A1C: CPT

## 2025-04-07 PROCEDURE — 73502 X-RAY EXAM HIP UNI 2-3 VIEWS: CPT | Mod: LT

## 2025-04-07 PROCEDURE — 93005 ELECTROCARDIOGRAM TRACING: CPT

## 2025-04-07 PROCEDURE — 85025 COMPLETE CBC W/AUTO DIFF WBC: CPT

## 2025-04-07 PROCEDURE — 87081 CULTURE SCREEN ONLY: CPT | Mod: BEALAB

## 2025-04-07 RX ORDER — CHLORHEXIDINE GLUCONATE ORAL RINSE 1.2 MG/ML
15 SOLUTION DENTAL DAILY
Qty: 30 ML | Refills: 0 | Status: SHIPPED | OUTPATIENT
Start: 2025-04-07 | End: 2025-04-09

## 2025-04-07 RX ORDER — CHLORHEXIDINE GLUCONATE 40 MG/ML
SOLUTION TOPICAL DAILY
Qty: 470 ML | Refills: 0 | Status: SHIPPED | OUTPATIENT
Start: 2025-04-07 | End: 2025-04-12

## 2025-04-07 ASSESSMENT — DUKE ACTIVITY SCORE INDEX (DASI)
CAN YOU DO YARD WORK LIKE RAKING LEAVES, WEEDING OR PUSHING A MOWER: NO
CAN YOU HAVE SEXUAL RELATIONS: YES
CAN YOU PARTICIPATE IN MODERATE RECREATIONAL ACTIVITIES LIKE GOLF, BOWLING, DANCING, DOUBLES TENNIS OR THROWING A BASEBALL OR FOOTBALL: YES
CAN YOU DO MODERATE WORK AROUND THE HOUSE LIKE VACUUMING, SWEEPING FLOORS OR CARRYING GROCERIES: YES
CAN YOU WALK A BLOCK OR TWO ON LEVEL GROUND: YES
CAN YOU CLIMB A FLIGHT OF STAIRS OR WALK UP A HILL: YES
CAN YOU TAKE CARE OF YOURSELF (EAT, DRESS, BATHE, OR USE TOILET): YES
CAN YOU DO HEAVY WORK AROUND THE HOUSE LIKE SCRUBBING FLOORS OR LIFTING AND MOVING HEAVY FURNITURE: NO
CAN YOU PARTICIPATE IN STRENOUS SPORTS LIKE SWIMMING, SINGLES TENNIS, FOOTBALL, BASKETBALL, OR SKIING: NO
CAN YOU DO LIGHT WORK AROUND THE HOUSE LIKE DUSTING OR WASHING DISHES: YES
DASI METS SCORE: 6.5
CAN YOU WALK INDOORS, SUCH AS AROUND YOUR HOUSE: YES
TOTAL_SCORE: 30.2
CAN YOU RUN A SHORT DISTANCE: NO

## 2025-04-07 ASSESSMENT — PAIN - FUNCTIONAL ASSESSMENT: PAIN_FUNCTIONAL_ASSESSMENT: 0-10

## 2025-04-07 ASSESSMENT — PAIN SCALES - GENERAL: PAINLEVEL_OUTOF10: 5 - MODERATE PAIN

## 2025-04-07 ASSESSMENT — PAIN DESCRIPTION - DESCRIPTORS: DESCRIPTORS: ACHING

## 2025-04-07 NOTE — PREPROCEDURE INSTRUCTIONS
Medication List            Accurate as of April 7, 2025 11:42 AM. Always use your most recent med list.                cetirizine 10 mg tablet  Commonly known as: ZyrTEC  TAKE 1 TABLET BY MOUTH EVERY DAY  Medication Adjustments for Surgery: Do Not take on the morning of surgery  Notes to patient: Last dose preoperatively 4/27/2025     * chlorhexidine 4 % external liquid  Commonly known as: Hibiclens  Apply topically once daily for 5 days.  Medication Adjustments for Surgery: Take/Use as prescribed     * chlorhexidine 0.12 % solution  Commonly known as: Peridex  Use 15 mL in the mouth or throat once daily for 2 doses. 15 ML night before surgery and 15 ML morning of surgery. Swish and spit  Medication Adjustments for Surgery: Take/Use as prescribed     cholecalciferol 50 MCG (2000 UT) tablet  Commonly known as: Vitamin D-3  Additional Medication Adjustments for Surgery: Take last dose 7 days before surgery  Notes to patient: Last dose preoperatively 4/20/2025     fluticasone 50 mcg/actuation nasal spray  Commonly known as: Flonase  Administer 1 spray into each nostril once daily. Shake gently. Before first use, prime pump. After use, clean tip and replace cap.  Medication Adjustments for Surgery: Take/Use as prescribed     levothyroxine 100 mcg tablet  Commonly known as: Synthroid, Levoxyl  Take 1 tablet (100 mcg) by mouth once daily.  Medication Adjustments for Surgery: Take/Use as prescribed           * This list has 2 medication(s) that are the same as other medications prescribed for you. Read the directions carefully, and ask your doctor or other care provider to review them with you.                NPO Instructions:     Do not eat any food after midnight the night before your surgery/procedure.  You may have clear liquids until TWO hours before surgery/procedure. This includes water, black tea/coffee, (no milk or cream) apple juice and electrolyte drinks (Gatorade).  You may chew gum up to TWO hours before  your surgery/procedure.     Additional Instructions:      Seven/Six Days before Surgery:  Review your medication instructions, stop indicated medications  Five Days before Surgery:  Review your medication instructions, stop indicated medications  Begin using your Hibiclens  Three Days before Surgery:  Review your medication instructions, stop indicated medications  The Day before Surgery:  Start using 0.12% CHG mouthwash  No smoking or alcohol use 24 hours before surgery  Review your medication instructions, stop indicated medications  You will be contacted regarding the time of your arrival to facility and surgery time  Do not eat any food after Midnight  Day of Surgery:  Review your medication instructions, take indicated medications  If you have diabetes, please check your fasting blood sugar upon awakening.  If fasting blood sugar is <80 mg/dl, drink 100 ml of apple juice, time limit of 2 hours before  You may have clear liquids until TWO hours before surgery/procedure.  This includes water, black tea/coffee, (no milk or cream) apple juice and electrolyte drinks (Gatorade)  You may chew gum up to TWO hours before your surgery/procedure  Wear  comfortable loose fitting clothing  Do not use moisturizers, creams, lotions or perfume  All jewelry and valuables should be left at home     CONTACT SURGEON'S OFFICE IF YOU DEVELOP:  * Fever = 100.4 F   * New respiratory symptoms (e.g. cough, shortness of breath, respiratory distress, sore throat)  * Recent loss of taste or smell  *Flu like symptoms such as headache, fatigue or gastrointestinal symptoms  * You develop any open sores, shingles, burning or painful urination   AND/OR:  * You no longer wish to have the surgery.  * Any other personal circumstances change that may lead to the need to cancel or defer this surgery.  *You were admitted to any hospital within one week of your planned procedure.     SMOKING:  *Quitting smoking can make a huge difference to your  health and recovery from surgery.    *If you need help with quitting, call 0-670-QUIT-NOW.     THE DAY BEFORE SURGERY:  *Do not eat any food after midnight the night before your surgery.   *You may have up to TEN OUNCES of clear liquids until TWO hours before your instructed ARRIVAL TIME to hospital. This includes water, black tea/coffee, (no milk or cream) apple juice, clear broth and electrolyte drinks (Gatorade). Please avoid clear liquids that are red in color.   *You may chew gum/mints up to TWO hours before your surgery/procedure.     SURGICAL TIME:  *You will be contacted between 2 p.m. and 3 p.m. the business day before your surgery with your arrival time.  *If you haven't received a call by 3pm, call [phone]  *Scheduled surgery times may change and you will be notified if this occurs-check your personal voicemail for any updates.     ON THE MORNING OF SURGERY:  *Wear comfortable, loose fitting clothing.   *Do not use moisturizers, creams, lotions or perfume.  *All jewelry and valuables should be left at home.  *Prosthetic devices such as contact lenses, hearing aids, dentures, eyelash extensions, hairpins and body piercing must be removed before surgery.     BRING WITH YOU:  *Photo ID and insurance card  *Current list of medications and allergies  *Pacemaker/Defibrillator/Heart stent cards  *CPAP machine and mask  *Slings/splints/crutches  *Copy of your complete Advanced Directive/DHPOA-if applicable  *Neurostimulator implant remote     PARKING AND ARRIVAL:  *Check in at the Main Entrance desk and let them know you are here for surgery.     IF YOU ARE HAVING OUTPATIENT/SAME DAY SURGERY:  *A responsible adult MUST accompany you at the time of discharge and stay with you for 24 hours after your surgery.  *You may NOT drive yourself home after surgery.  *You may use a taxi or ride sharing service ([employer], Uber) to return home ONLY if you are accompanied by a friend or family member.  *Instructions for  resuming your medications will be provided by your surgeon.     Thank you for coming to Pre Admission testing.      If I have prescribed medication please don't forget to  at your pharmacy.      Any questions about today's visit call [phone] and leave a message in the general mailbox.     Patient instructed to ambulate as soon as possible postoperatively to decrease thromboembolic risk.     Josef Bangura, APRN-CNP     Thank you for visiting [facility].  If you have any changes to your health condition, please call the surgeons office to alert them and give them details of your symptoms.        Preoperative Fasting Guidelines     Why must I stop eating and drinking near surgery time?  With sedation, food or liquid in your stomach can enter your lungs causing serious complications  Increases nausea and vomiting     When do I need to stop eating and drinking before my surgery?  Do not eat any food after midnight the night before your surgery/procedure.  You may have up to TEN ounces of clear liquid until TWO hours before your instructed arrival time to the hospital.  This includes water, black tea/coffee, (no milk or cream) apple juice, and electrolyte drinks (Gatorade)  You may chew gum until TWO hours before your surgery/procedure        Additional Instructions:      The Day before Surgery:  -Review your medication instructions, stop indicated medications  -You will be contacted in the evening regarding the time of your arrival to facility and surgery time     Day of Surgery:  -Review your medication instructions, take indicated medications  -Wear comfortable loose fitting clothing  -Do not use moisturizers, creams, lotions or perfume  -All jewelry and valuables should be left at home                   Preoperative Brain Exercises     What are brain exercises?  A brain exercise is any activity that engages your thinking (cognitive) skills.     What types of activities are  considered brain exercises?  Jigsaw puzzles, crossword puzzles, word jumble, memory games, word search, and many more.  Many can be found free online or on your phone via a mobile brii.     Why should I do brain exercises before my surgery?  More recent research has shown brain exercise before surgery can lower the risk of postoperative delirium (confusion) which can be especially important for older adults.  Patients who did brain exercises for 5 to 10 minutes/day in the days before surgery, cut their risk of postoperative delirium in half up to 1 week after surgery.                         [facility]     Preoperative Deep Breathing Exercises     Why it is important to do deep breathing exercises before my surgery?  Deep breathing exercises strengthen your breathing muscles.  This helps you to recover after your surgery and decreases the chance of breathing complications.        How are the deep breathing exercises done?  Sit straight with your back supported.  Breathe in deeply and slowly through your nose. Your lower rib cage should expand and your abdomen may move forward.  Hold that breath for 3 to 5 seconds.  Breathe out through pursed lips, slowly and completely.  Rest and repeat 10 times every hour while awake.  Rest longer if you become dizzy or lightheaded.                      [facility]     Preoperative Deep Breathing Exercises     Why it is important to do deep breathing exercises before my surgery?  Deep breathing exercises strengthen your breathing muscles.  This helps you to recover after your surgery and decreases the chance of breathing complications.        How are the deep breathing exercises done?  Sit straight with your back supported.  Breathe in deeply and slowly through your nose. Your lower rib cage should expand and your abdomen may move forward.  Hold that breath for 3 to 5 seconds.  Breathe out through pursed lips, slowly and  completely.  Rest and repeat 10 times every hour while awake.  Rest longer if you become dizzy or lightheaded.        Patient Information: Incentive Spirometer  What is an incentive spirometer?  An incentive spirometer is a device used before and after surgery to “exercise” your lungs.  It helps you to take deeper breaths to expand your lungs.  Below is an example of a basic incentive spirometer.  The device you receive may differ slightly but they all function the same.    Why do I need to use an incentive spirometer?  Using your incentive spirometer prepares your lungs for surgery and helps prevent lung problems after surgery.  How do I use my incentive spirometer?  When you're using your incentive spirometer, make sure to breathe through your mouth. If you breathe through your nose, the incentive spirometer won't work properly. You can hold your nose if you have trouble.  If you feel dizzy at any time, stop and rest. Try again at a later time.  Follow the steps below:  Set up your incentive spirometer, expand the flexible tubing and connect to the outlet.  Sit upright in a chair or bed. Hold the incentive spirometer at eye level.   Put the mouthpiece in your mouth and close your lips tightly around it. Slowly breathe out (exhale) completely.  Breathe in (inhale) slowly through your mouth as deeply as you can. As you take a breath, you will see the piston rise inside the large column. While the piston rises, the indicator should move upwards. It should stay in between the 2 arrows (see Figure).  Try to get the piston as high as you can, while keeping the indicator between the arrows.   If the indicator doesn't stay between the arrows, you're breathing either too fast or too slow.  When you get it as high as you can, hold your breath for 10 seconds, or as long as possible. While you're holding your breath, the piston will slowly fall to the base of the spirometer.  Once the piston reaches the bottom of the  spirometer, breathe out slowly through your mouth. Rest for a few seconds.  Repeat 10 times. Try to get the piston to the same level with each breath.  Repeat every hour while awake  You can carefully clean the outside of the mouthpiece with an alcohol wipe or soap and water.       Patient and Family Education             Ways You Can Help Prevent Blood Clots                    This handout explains some simple things you can do to help prevent blood clots.      Blood clots are blockages that can form in the body's veins. When a blood clot forms in your deep veins, it may be called a deep vein thrombosis, or DVT for short. Blood clots can happen in any part of the body where blood flows, but they are most common in the arms and legs. If a piece of a blood clot breaks free and travels to the lungs, it is called a pulmonary embolus (PE). A PE can be a very serious problem.         Being in the hospital or having surgery can raise your chances of getting a blood clot because you may not be well enough to move around as much as you normally do.         Ways you can help prevent blood clots in the hospital           Wearing SCDs. SCDs stands for Sequential Compression Devices.   SCDs are special sleeves that wrap around your legs  They attach to a pump that fills them with air to gently squeeze your legs every few minutes.   This helps return the blood in your legs to your heart.   SCDs should only be taken off when walking or bathing.   SCDs may not be comfortable, but they can help save your life.                                            Wearing compression stockings - if your doctor orders them. These special snug fitting stockings gently squeeze your legs to help blood flow.       Walking. Walking helps move the blood in your legs.   If your doctor says it is ok, try walking the halls at least   5 times a day. Ask us to help you get up, so you don't fall.      Taking any blood thinning medicines your doctor orders.         Page 1 of 2            [facility]; 3/23   Ways you can help prevent blood clots at home         Wearing compression stockings - if your doctor orders them. ? Walking - to help move the blood in your legs.       Taking any blood thinning medicines your doctor orders.      Signs of a blood clot or PE        Tell your doctor or nurse know right away if you have of the problems listed below.    If you are at home, seek medical care right away. Call 911 for chest pain or problems breathing.                Signs of a blood clot (DVT) - such as pain,  swelling, redness or warmth in your arm or leg      Signs of a pulmonary embolism (PE) - such as chest pain or feeling short of breath    Patient Information: Pre-Operative Infection Prevention Measures     Why did I have my nose, under my arms, and groin swabbed?  The purpose of the swab is to identify Staphylococcus aureus inside your nose or on your skin.  The swab was sent to the laboratory for culture.  A positive swab/culture for Staphylococcus aureus is called colonization or carriage.      What is Staphylococcus aureus?  Staphylococcus aureus, also known as “staph”, is a germ found on the skin or in the nose of healthy people.  Sometimes Staphylococcus aureus can get into the body and cause an infection.  This can be minor (such as pimples, boils, or other skin problems).  It might also be serious (such as a blood infection, pneumonia, or a surgical site infection).    What is Staphylococcus aureus colonization or carriage?  Colonization or carriage means that a person has the germ but is not sick from it.  These bacteria can be spread on the hands or when breathing or sneezing.    How is Staphylococcus aureus spread?  It is most often spread by close contact with a person or item that carries it.    What happens if my culture is positive for Staphylococcus aureus?  Your doctor/medical team will use this information to guide any antibiotic treatment  which may be necessary.  Regardless of the culture results, we will clean the inside of your nose with a betadine swab just before you have your surgery.      Will I get an infection if I have Staphylococcus aureus in my nose or on my skin?  Anyone can get an infection with Staphylococcus aureus.  However, the best way to reduce your risk of infection is to follow the instructions provided to you for the use of your CHG soap and dental rinse.        Patient Information: Oral/Dental Rinse    What is oral/dental rinse?   It is a mouthwash. It is a way of cleaning the mouth with a germ-killing solution before your surgery.  The solution contains chlorhexidine, commonly known as CHG.   It is used inside the mouth to kill a bacteria known as Staphylococcus aureus.  Let your doctor know if you are allergic to Chlorhexidine.    We have sent a prescription for CHG 0.12% mouthwash to your preferred pharmacy.  If you have not already, Please  your prescription and start using the day before before surgery.  Follow the instruction sheet provided to you at your CPM/PAT appointment. Please contact [facility] if you do not receive your CHG mouthwash prescription.     Why do I need to use CHG oral/dental rinse?  The CHG oral/dental rinse helps to kill a bacteria in your mouth known as Staphylococcus aureus.     This reduces the risk of infection at the surgical site.      Using your CHG oral/dental rinse  STEPS:  Use your CHG oral/dental rinse after you brush your teeth the night before (at bedtime) and the morning of your surgery.  Follow all directions on your prescription label.    Use the cap on the container to measure 15ml   Swish (gargle if you can) the mouthwash in your mouth for at least 30 seconds, (do not swallow) and spit out  After you use your CHG rinse, do not rinse your mouth with water, drink or eat.  Please refer to the prescription label for the appropriate time to resume oral intake      What side effects  might I have using the CHG oral/dental rinse?  CHG rinse will stick to plaque on the teeth.  Brush and floss just before use.  Teeth brushing will help avoid staining of plaque during use.      Patient Information: Home Preoperative Antibacterial Shower      What is a home preoperative antibacterial shower?  This shower is a way of cleaning the skin with a germ-killing solution before surgery.  The solution contains chlorhexidine, commonly known as CHG.  CHG is a skin cleanser with germ-killing ability.  Let your doctor know if you are allergic to chlorhexidine.    Why do I need to take a preoperative antibacterial shower?  Skin is not sterile.  It is best to try to make your skin as free of germs as possible before surgery.  Proper cleansing with a germ-killing soap before surgery can lower the number of germs on your skin.  This helps to reduce the risk of infection at the surgical site.  Following the instructions listed below will help you prepare your skin for surgery.      How do I use the solution?  Steps:  Begin using your CHG soap 5 days before your scheduled surgery on 4/23/2025.    First, wash and rinse your hair using the CHG soap. Keep CHG soap away from ear canals and eyes.  Rinse completely, do not condition.  Hair extensions should be removed.  Wash your face with your normal soap and rinse.    Apply the CHG solution to a clean wet washcloth.  Turn the water off or move away from the water spray to avoid premature rinsing of the CHG soap as you are applying.   Firmly lather your entire body from the neck down.  Do not use on your face.  Pay special attention to the area(s) where your incision(s) will be located unless they are on your face.  Avoid scrubbing your skin too hard.  The important point is to have the CHG soap sit on your skin for 3 minutes.    When the 3 minutes are up, turn on the water and rinse the CHG solution off your body completely.   DO NOT wash with regular soap after you have  used the CHG soap solution  Pat yourself dry with a clean, freshly-laundered towel.  DO NOT apply powders, deodorants, or lotions.  Dress in clean, freshly laundered nightclothes.    Be sure to sleep with clean, freshly laundered sheets.  Be aware that CHG will cause stains on fabrics; if you wash them with bleach after use.  Rinse your washcloth and other linens that have contact with CHG completely.  Use only non-chlorine detergents to launder the items used.   The morning of surgery is the fifth day.  Repeat the above steps and dress in clean comfortable clothing     Whom should I contact if I have any questions regarding the use of CHG soap?  Call the [facility] at [phone] if you have any questions.

## 2025-04-07 NOTE — CPM/PAT H&P
CPM/PAT Evaluation       Name: Dora Trimble (Dora Trimble)  /Age: 1960/64 y.o.     In-Person       Chief Complaint: Unilateral primary osteoarthritis, left hip     HPI  Patient is an alert and oriented 64 year old female scheduled for a left total hip arthroplasty on 2025 with Dr. Ingram for unilateral primary osteoarthritis, left hip. She endorses pain that she currently rate at a 5/10 which worsens during ambulation and activity. She is ambulatory without assistive devices with a current METS score of 6.5.  PMHX includes Obesity, OA, and hypothyroidism. Presents to [facility] PAT today for perioperative risk stratification and optimization.     Past Medical History:   Diagnosis Date    Hypothyroidism     OA (osteoarthritis) of hip     Obesity (BMI 30-39.9)      Past Surgical History:   Procedure Laterality Date    COLONOSCOPY  12/10/2013    Complete Colonoscopy    OTHER SURGICAL HISTORY  2013    Thyroid Surgery Sub-Total Thyroidectomy    OTHER SURGICAL HISTORY  12/10/2013    Nerve Block Transforaminal Epidural Lumbar     Patient  has no history on file for sexual activity.    No family history on file.    Allergies   Allergen Reactions    Bee Pollen Unknown    Cat Dander Unknown    Shellfish Containing Products Unknown     Prior to Admission medications    Medication Sig Start Date End Date Taking? Authorizing Provider   cetirizine (ZyrTEC) 10 mg tablet TAKE 1 TABLET BY MOUTH EVERY DAY  Patient not taking: Reported on 3/21/2025 4/4/24   Olga Johnson DO   cholecalciferol (Vitamin D-3) 50 MCG (2000) tablet Take 1 tablet (50 mcg) by mouth once daily.  Patient not taking: Reported on 3/21/2025 2/2/23   Historical Provider, MD   fluticasone (Flonase) 50 mcg/actuation nasal spray Administer 1 spray into each nostril once daily. Shake gently. Before first use, prime pump. After use, clean tip and replace cap. 3/12/24 3/12/25  Olga Johnson DO   levothyroxine (Synthroid, Levoxyl) 100 mcg  tablet Take 1 tablet (100 mcg) by mouth once daily. 3/12/25   Olga Johnson DO       Review of Systems   Constitutional: Negative for chills, decreased appetite, diaphoresis, fever and malaise/fatigue.   Eyes:  Negative for blurred vision and double vision.   Cardiovascular:  Negative for chest pain, claudication, cyanosis, dyspnea on exertion, irregular heartbeat, leg swelling, near-syncope and palpitations.   Respiratory:  Negative for cough, hemoptysis, shortness of breath and wheezing.    Endocrine: Negative for cold intolerance, heat intolerance, polydipsia, polyphagia and polyuria.   Gastrointestinal:  Negative for abdominal pain, constipation, diarrhea, dysphagia, nausea and vomiting.   Genitourinary:  Negative for bladder incontinence, dysuria, hematuria, incomplete emptying, nocturia, frequency, pelvic pain and urgency.   Neurological:  Negative for headaches, light-headedness, paresthesias, sensory change and weakness.   Psychiatric/Behavioral:  Negative for altered mental status.    Musculoskeletal: Negative for myalgias. Positive for arthralgias     Vitals and nursing note reviewed.     Physical exam  Constitutional:       Appearance: Normal appearance. She is Obese.   HENT:      Head: Normocephalic and atraumatic.      Mouth/Throat:      Mouth: Mucous membranes are moist.      Pharynx: Oropharynx is clear.   Eyes:      Extraocular Movements: Extraocular movements intact.      Conjunctiva/sclera: Conjunctivae normal.      Pupils: Pupils are equal, round, and reactive to light.   Cardiovascular:      PMI at left midclavicular line. Normal rate. Regular rhythm. Normal S1. Normal S2.       Murmurs: There is no murmur.      No gallop.  No click. No rub.       No audible carotid bruit     No lower extremity edema on exam  Pulmonary:      Effort: Pulmonary effort is normal.      Breath sounds: Normal breath sounds.   Abdominal:      General: Abdomen is flat. Bowel sounds are normal.      Palpations: Abdomen  "is soft and non-tender  Musculoskeletal:      Cervical back: Normal range of motion and neck supple.      Hip, left: Limited ROM  Skin:     General: Skin is warm and dry.      Capillary Refill: Capillary refill takes less than 2 seconds.   Neurological:      General: No focal deficit present.      Mental Status: She is alert and oriented to person, place, and time. Mental status is at baseline.   Psychiatric:         Mood and Affect: Mood normal.         Behavior: Behavior normal.         Thought Content: Thought content normal.         Judgment: Judgment normal.     Vitals and nursing note reviewed. Physical exam within normal limits.      Visit Vitals  /68   Pulse 50   Temp 36.7 °C (98.1 °F) (Temporal)   Resp 16   Ht 1.651 m (5' 5\")   Wt 107 kg (235 lb 1.6 oz)   SpO2 98%   BMI 39.12 kg/m²   OB Status Postmenopausal   Smoking Status Never   BSA 2.22 m²     DASI Risk Score      Flowsheet Row Pre-Admission Testing from 4/7/2025 in [facility]   Can you take care of yourself (eat, dress, bathe, or use toilet)?  2.75 filed at 04/07/2025 1203   Can you walk indoors, such as around your house? 1.75 filed at 04/07/2025 1203   Can you walk a block or two on level ground?  2.75 filed at 04/07/2025 1203   Can you climb a flight of stairs or walk up a hill? 5.5 filed at 04/07/2025 1203   Can you run a short distance? 0 filed at 04/07/2025 1203   Can you do light work around the house like dusting or washing dishes? 2.7 filed at 04/07/2025 1203   Can you do moderate work around the house like vacuuming, sweeping floors or carrying groceries? 3.5 filed at 04/07/2025 1203   Can you do heavy work around the house like scrubbing floors or lifting and moving heavy furniture?  0 filed at 04/07/2025 1203   Can you do yard work like raking leaves, weeding or pushing a mower? 0 filed at 04/07/2025 1203   Can you have sexual relations? 5.25 filed at 04/07/2025 1203   Can you participate in moderate recreational " activities like golf, bowling, dancing, doubles tennis or throwing a baseball or football? 6 filed at 04/07/2025 1203   Can you participate in strenous sports like swimming, singles tennis, football, basketball, or skiing? 0 filed at 04/07/2025 1203   DASI SCORE 30.2 filed at 04/07/2025 1203   METS Score (Will be calculated only when all the questions are answered) 6.5 filed at 04/07/2025 1203          Caprini DVT Assessment      Flowsheet Row Pre-Admission Testing from 4/7/2025 in [facility]   DVT Score (IF A SCORE IS NOT CALCULATING, MUST SELECT A BMI TO COMPLETE) 13 filed at 04/07/2025 1202   Medical Factors Family history of DVT/PE filed at 04/07/2025 1202   Surgical Factors Elective major lower extremity arthroplasty filed at 04/07/2025 1202   BMI (BMI MUST BE CHOSEN) 31-40 (Obesity) filed at 04/07/2025 1202          Modified Frailty Index      Flowsheet Row Pre-Admission Testing from 4/7/2025 in [facility]   Non-independent functional status (problems with dressing, bathing, personal grooming, or cooking) 0 filed at 04/07/2025 1203   History of diabetes mellitus  0 filed at 04/07/2025 1203   History of COPD 0 filed at 04/07/2025 1203   History of CHF No filed at 04/07/2025 1203   History of MI 0 filed at 04/07/2025 1203   History of Percutaneous Coronary Intervention, Cardiac Surgery, or Angina No filed at 04/07/2025 1203   Hypertension requiring the use of medication  0 filed at 04/07/2025 1203   Peripheral vascular disease 0 filed at 04/07/2025 1203   Impaired sensorium (cognitive impairement or loss, clouding, or delirium) 0 filed at 04/07/2025 1203   TIA or CVA withouy residual deficit 0 filed at 04/07/2025 1203   Cerebrovascular accident with deficit 0 filed at 04/07/2025 1203   Modified Frailty Index Calculator 0 filed at 04/07/2025 1203          [id number] Stroke Risk Points  Current as of just now        N/A 0 to 9 Points:      Last Change: N/A          The  [id number] risk score (Mahesh MCALLISTER et al. 2009. © 2010 [employer]) quantifies the risk of stroke for a patient with atrial fibrillation. For patients without atrial fibrillation or under the age of 18 this score appears as N/A. Higher score values generally indicate higher risk of stroke.        This score is not applicable to this patient. Components are not calculated.          Revised Cardiac Risk Index      Flowsheet Row Pre-Admission Testing from 4/7/2025 in [facility]   High-Risk Surgery (Intraperitoneal, Intrathoracic,Suprainguinal vascular) 0 filed at 04/07/2025 1203   History of ischemic heart disease (History of MI, History of positive exercuse test, Current chest paint considered due to myocardial ischemia, Use of nitrate therapy, ECG with pathological Q Waves) 0 filed at 04/07/2025 1203   History of congestive heart failure (pulmonary edemia, bilateral rales or S3 gallop, Paroxysmal nocturnal dyspnea, CXR showing pulmonary vascular redistribution) 0 filed at 04/07/2025 1203   History of cerebrovascular disease (Prior TIA or stroke) 0 filed at 04/07/2025 1203   Pre-operative insulin treatment 0 filed at 04/07/2025 1203   Pre-operative creatinine>2 mg/dl 0 filed at 04/07/2025 1203   Revised Cardiac Risk Calculator 0 filed at 04/07/2025 1203          Apfel Simplified Score    No data to display       Risk Analysis Index Results This Encounter    No data found in the last 10 encounters.       Stop Bang Score      Flowsheet Row Pre-Admission Testing from 4/7/2025 in [facility]   Do you snore loudly? 0 filed at 04/07/2025 1129   Do you often feel tired or fatigued after your sleep? 0 filed at 04/07/2025 1129   Has anyone ever observed you stop breathing in your sleep? 0 filed at 04/07/2025 1129   Do you have or are you being treated for high blood pressure? 0 filed at 04/07/2025 1129   Recent BMI (Calculated) 39.1 filed at 04/07/2025 1129   Is BMI  greater than 35 kg/m2? 1=Yes filed at 04/07/2025 1129   Age older than 50 years old? 1=Yes filed at 04/07/2025 1129   Is your neck circumference greater than 17 inches (Male) or 16 inches (Female)? 1 filed at 04/07/2025 1129   Gender - Male 0=No filed at 04/07/2025 1129   STOP-BANG Total Score 3 filed at 04/07/2025 1129          Prodigy: High Risk  Total Score: 8              Prodigy Age Score           ARISCAT Score for Postoperative Pulmonary Complications      Flowsheet Row Pre-Admission Testing from 4/7/2025 in [facility]   Age Calculated Score 3 filed at 04/07/2025 1203   Preoperative SpO2 0 filed at 04/07/2025 1203   Respiratory infection in the last month Either upper or lower (i.e., URI, bronchitis, pneumonia), with fever and antibiotic treatment 0 filed at 04/07/2025 1203   Preoperative anemia (Hgb less than 10 g/dl) 0 filed at 04/07/2025 1203   Surgical incision  0 filed at 04/07/2025 1203   Duration of surgery  16 filed at 04/07/2025 1203   Emergency Procedure  0 filed at 04/07/2025 1203   ARISCAT Total Score  19 filed at 04/07/2025 1203          Vargas Perioperative Risk for Myocardial Infarction or Cardiac Arrest (JOEL)      Flowsheet Row Pre-Admission Testing from 4/7/2025 in [facility]   Calculated Age Score 1.28 filed at 04/07/2025 1203   Functional Status  0 filed at 04/07/2025 1203   ASA Class  -3.29 filed at 04/07/2025 1203   Creatinine 0 filed at 04/07/2025 1203   Type of Procedure  0.80 filed at 04/07/2025 1203   JOEL Total Score  -6.46 filed at 04/07/2025 1203   JOEL % 0.16 filed at 04/07/2025 1203          Assessment & Plan:    Neuro:  No diagnosis or significant findings on chart review or clinical presentation and evaluation.     HEENT/Airway:  No diagnosis or significant findings on chart review or clinical presentation and evaluation.   STOP-BANG Score-3 points moderate risk for JEN    Mallampati::  II    TM distance::  >3 FB    Neck ROM::   Full  Dentures-denies  Crowns-reports x 2  Implants-reports x 1    Cardiovascular:  No diagnosis or significant findings on chart review or clinical presentation and evaluation.   METS: 6.5  RCRI: 0 points, 3.9%  risk for postoperative MACE   JOEL: 0.16% risk for postoperative MACE  EKG -Sinus bradycardia with nonspecific ST and T wave changes Rate-48 No acute changes.           Pulmonary:  No diagnosis or significant findings on chart review or clinical presentation and evaluation.   ARISCAT: <26 points, 1.6% risk of in-hospital postoperative pulmonary complication  PRODIGY: Low risk for opioid induced respiratory depression  Smoking History-She has never smoked.  Discussed smoking cessation and deep breathing handout given    Renal/Urinary:  No diagnosis or significant findings on chart review or clinical presentation and evaluation, however, the patient is at increased risk of perioperative renal complications secondary to age>/= 56. Preventative measures include BP monitoring, medication compliance, and hydration management.   CMP-Reviewed, stable  Creatinine-0.74  GFR-90    Endocrine:  No significant findings on chart review or clinical presentation and evaluation.   History of Hypothyroidism-Managed with Levothyroxine.   TSH-1.50    JGR2G-2.7%    Hematologic/Immunology:  No diagnosis or significant findings on chart review or clinical presentation and evaluation.  The patient is not a Jehovah’s witness and will accept blood and blood products if medically indicated.   History of previous blood transfusions No  CBC-reviewed, stable  HGB-12.9  Caprini Score 13, patient at High for postoperative DVT. Pt supplied education/VTE handout  Anticoagulation use: No     Gastrointestinal:   No diagnosis or significant findings on chart review or clinical presentation and evaluation.   Recreational drug use: alcohol  Alcohol use social drinker    Infectious disease:   No diagnosis or significant findings on chart review or  clinical presentation and evaluation.   Prescription provided for CHG body wash and dental rinse. CHG use instructions reviewed and provided to patient.  Staph screen collected-Positive for MSSA    Musculoskeletal:   No diagnosis on chart review or clinical presentation and evaluation.   Positive for Obesity-BMI 39.12  JHFRAT score-3 points. low risk for falls    Anesthesia:  ASA 2 - Patient with mild systemic disease with no functional limitations  Anticipated anesthesia-Consult  History of General anesthesia- yes  Complications- No anesthesia complications  No family history of anesthesia complications    Labs & Imaging ordered:  CBC, CMP, HBA1C, MRSA, EKG  Nickel/metal allergy-negative  Shellfish allergy-positive    Overall, patient Low Risk for the scheduled Moderate Risk surgery. Discussed with patient medication instructions, NPO guidelines, and any questions or concerns.     Face to face time spent 45 minutes

## 2025-04-07 NOTE — H&P (VIEW-ONLY)
CPM/PAT Evaluation       Name: Dora Trimble (Dora Trimble)  /Age: 1960/64 y.o.     In-Person       Chief Complaint: Unilateral primary osteoarthritis, left hip     HPI  Patient is an alert and oriented 64 year old female scheduled for a left total hip arthroplasty on 2025 with Dr. Ingram for unilateral primary osteoarthritis, left hip. She endorses pain that she currently rate at a 5/10 which worsens during ambulation and activity. She is ambulatory without assistive devices with a current METS score of 6.5.  PMHX includes Obesity, OA, and hypothyroidism. Presents to [facility] today for perioperative risk stratification and optimization.     Past Medical History:   Diagnosis Date    Hypothyroidism     OA (osteoarthritis) of hip     Obesity (BMI 30-39.9)      Past Surgical History:   Procedure Laterality Date    COLONOSCOPY  12/10/2013    Complete Colonoscopy    OTHER SURGICAL HISTORY  2013    Thyroid Surgery Sub-Total Thyroidectomy    OTHER SURGICAL HISTORY  12/10/2013    Nerve Block Transforaminal Epidural Lumbar     Patient  has no history on file for sexual activity.    No family history on file.    Allergies   Allergen Reactions    Bee Pollen Unknown    Cat Dander Unknown    Shellfish Containing Products Unknown     Prior to Admission medications    Medication Sig Start Date End Date Taking? Authorizing Provider   cetirizine (ZyrTEC) 10 mg tablet TAKE 1 TABLET BY MOUTH EVERY DAY  Patient not taking: Reported on 3/21/2025 4/4/24   Olga Johnson DO   cholecalciferol (Vitamin D-3) 50 MCG (2000) tablet Take 1 tablet (50 mcg) by mouth once daily.  Patient not taking: Reported on 3/21/2025 2/2/23   Historical Provider, MD   fluticasone (Flonase) 50 mcg/actuation nasal spray Administer 1 spray into each nostril once daily. Shake gently. Before first use, prime pump. After use, clean tip and replace cap. 3/12/24 3/12/25  Olga Johnson DO   levothyroxine (Synthroid, Levoxyl) 100 mcg  tablet Take 1 tablet (100 mcg) by mouth once daily. 3/12/25   Olga Johnson DO       Review of Systems   Constitutional: Negative for chills, decreased appetite, diaphoresis, fever and malaise/fatigue.   Eyes:  Negative for blurred vision and double vision.   Cardiovascular:  Negative for chest pain, claudication, cyanosis, dyspnea on exertion, irregular heartbeat, leg swelling, near-syncope and palpitations.   Respiratory:  Negative for cough, hemoptysis, shortness of breath and wheezing.    Endocrine: Negative for cold intolerance, heat intolerance, polydipsia, polyphagia and polyuria.   Gastrointestinal:  Negative for abdominal pain, constipation, diarrhea, dysphagia, nausea and vomiting.   Genitourinary:  Negative for bladder incontinence, dysuria, hematuria, incomplete emptying, nocturia, frequency, pelvic pain and urgency.   Neurological:  Negative for headaches, light-headedness, paresthesias, sensory change and weakness.   Psychiatric/Behavioral:  Negative for altered mental status.    Musculoskeletal: Negative for myalgias. Positive for arthralgias     Vitals and nursing note reviewed.     Physical exam  Constitutional:       Appearance: Normal appearance. She is Obese.   HENT:      Head: Normocephalic and atraumatic.      Mouth/Throat:      Mouth: Mucous membranes are moist.      Pharynx: Oropharynx is clear.   Eyes:      Extraocular Movements: Extraocular movements intact.      Conjunctiva/sclera: Conjunctivae normal.      Pupils: Pupils are equal, round, and reactive to light.   Cardiovascular:      PMI at left midclavicular line. Normal rate. Regular rhythm. Normal S1. Normal S2.       Murmurs: There is no murmur.      No gallop.  No click. No rub.       No audible carotid bruit     No lower extremity edema on exam  Pulmonary:      Effort: Pulmonary effort is normal.      Breath sounds: Normal breath sounds.   Abdominal:      General: Abdomen is flat. Bowel sounds are normal.      Palpations: Abdomen  "is soft and non-tender  Musculoskeletal:      Cervical back: Normal range of motion and neck supple.      Hip, left: Limited ROM  Skin:     General: Skin is warm and dry.      Capillary Refill: Capillary refill takes less than 2 seconds.   Neurological:      General: No focal deficit present.      Mental Status: She is alert and oriented to person, place, and time. Mental status is at baseline.   Psychiatric:         Mood and Affect: Mood normal.         Behavior: Behavior normal.         Thought Content: Thought content normal.         Judgment: Judgment normal.     Vitals and nursing note reviewed. Physical exam within normal limits.      Visit Vitals  /68   Pulse 50   Temp 36.7 °C (98.1 °F) (Temporal)   Resp 16   Ht 1.651 m (5' 5\")   Wt 107 kg (235 lb 1.6 oz)   SpO2 98%   BMI 39.12 kg/m²   OB Status Postmenopausal   Smoking Status Never   BSA 2.22 m²     DASI Risk Score      Flowsheet Row Pre-Admission Testing from 4/7/2025 in [facility]   Can you take care of yourself (eat, dress, bathe, or use toilet)?  2.75 filed at 04/07/2025 1203   Can you walk indoors, such as around your house? 1.75 filed at 04/07/2025 1203   Can you walk a block or two on level ground?  2.75 filed at 04/07/2025 1203   Can you climb a flight of stairs or walk up a hill? 5.5 filed at 04/07/2025 1203   Can you run a short distance? 0 filed at 04/07/2025 1203   Can you do light work around the house like dusting or washing dishes? 2.7 filed at 04/07/2025 1203   Can you do moderate work around the house like vacuuming, sweeping floors or carrying groceries? 3.5 filed at 04/07/2025 1203   Can you do heavy work around the house like scrubbing floors or lifting and moving heavy furniture?  0 filed at 04/07/2025 1203   Can you do yard work like raking leaves, weeding or pushing a mower? 0 filed at 04/07/2025 1203   Can you have sexual relations? 5.25 filed at 04/07/2025 1203   Can you participate in moderate recreational " activities like golf, bowling, dancing, doubles tennis or throwing a baseball or football? 6 filed at 04/07/2025 1203   Can you participate in strenous sports like swimming, singles tennis, football, basketball, or skiing? 0 filed at 04/07/2025 1203   DASI SCORE 30.2 filed at 04/07/2025 1203   METS Score (Will be calculated only when all the questions are answered) 6.5 filed at 04/07/2025 1203          Caprini DVT Assessment      Flowsheet Row Pre-Admission Testing from 4/7/2025 in [facility]   DVT Score (IF A SCORE IS NOT CALCULATING, MUST SELECT A BMI TO COMPLETE) 13 filed at 04/07/2025 1202   Medical Factors Family history of DVT/PE filed at 04/07/2025 1202   Surgical Factors Elective major lower extremity arthroplasty filed at 04/07/2025 1202   BMI (BMI MUST BE CHOSEN) 31-40 (Obesity) filed at 04/07/2025 1202          Modified Frailty Index      Flowsheet Row Pre-Admission Testing from 4/7/2025 in [facility]   Non-independent functional status (problems with dressing, bathing, personal grooming, or cooking) 0 filed at 04/07/2025 1203   History of diabetes mellitus  0 filed at 04/07/2025 1203   History of COPD 0 filed at 04/07/2025 1203   History of CHF No filed at 04/07/2025 1203   History of MI 0 filed at 04/07/2025 1203   History of Percutaneous Coronary Intervention, Cardiac Surgery, or Angina No filed at 04/07/2025 1203   Hypertension requiring the use of medication  0 filed at 04/07/2025 1203   Peripheral vascular disease 0 filed at 04/07/2025 1203   Impaired sensorium (cognitive impairement or loss, clouding, or delirium) 0 filed at 04/07/2025 1203   TIA or CVA withouy residual deficit 0 filed at 04/07/2025 1203   Cerebrovascular accident with deficit 0 filed at 04/07/2025 1203   Modified Frailty Index Calculator 0 filed at 04/07/2025 1203          [id number] Stroke Risk Points  Current as of just now        N/A 0 to 9 Points:      Last Change: N/A          The  [id number] risk score (Mahesh MCALLISTER et al. 2009. © 2010 American College [employer]) quantifies the risk of stroke for a patient with atrial fibrillation. For patients without atrial fibrillation or under the age of 18 this score appears as N/A. Higher score values generally indicate higher risk of stroke.        This score is not applicable to this patient. Components are not calculated.          Revised Cardiac Risk Index      Flowsheet Row Pre-Admission Testing from 4/7/2025 in [facility]   High-Risk Surgery (Intraperitoneal, Intrathoracic,Suprainguinal vascular) 0 filed at 04/07/2025 1203   History of ischemic heart disease (History of MI, History of positive exercuse test, Current chest paint considered due to myocardial ischemia, Use of nitrate therapy, ECG with pathological Q Waves) 0 filed at 04/07/2025 1203   History of congestive heart failure (pulmonary edemia, bilateral rales or S3 gallop, Paroxysmal nocturnal dyspnea, CXR showing pulmonary vascular redistribution) 0 filed at 04/07/2025 1203   History of cerebrovascular disease (Prior TIA or stroke) 0 filed at 04/07/2025 1203   Pre-operative insulin treatment 0 filed at 04/07/2025 1203   Pre-operative creatinine>2 mg/dl 0 filed at 04/07/2025 1203   Revised Cardiac Risk Calculator 0 filed at 04/07/2025 1203          Apfel Simplified Score    No data to display       Risk Analysis Index Results This Encounter    No data found in the last 10 encounters.       Stop Bang Score      Flowsheet Row Pre-Admission Testing from 4/7/2025 in [facility]   Do you snore loudly? 0 filed at 04/07/2025 1129   Do you often feel tired or fatigued after your sleep? 0 filed at 04/07/2025 1129   Has anyone ever observed you stop breathing in your sleep? 0 filed at 04/07/2025 1129   Do you have or are you being treated for high blood pressure? 0 filed at 04/07/2025 1129   Recent BMI (Calculated) 39.1 filed at 04/07/2025 1129   Is BMI  greater than 35 kg/m2? 1=Yes filed at 04/07/2025 1129   Age older than 50 years old? 1=Yes filed at 04/07/2025 1129   Is your neck circumference greater than 17 inches (Male) or 16 inches (Female)? 1 filed at 04/07/2025 1129   Gender - Male 0=No filed at 04/07/2025 1129   STOP-BANG Total Score 3 filed at 04/07/2025 1129          Prodigy: High Risk  Total Score: 8              Prodigy Age Score           ARISCAT Score for Postoperative Pulmonary Complications      Flowsheet Row Pre-Admission Testing from 4/7/2025 in [facility]   Age Calculated Score 3 filed at 04/07/2025 1203   Preoperative SpO2 0 filed at 04/07/2025 1203   Respiratory infection in the last month Either upper or lower (i.e., URI, bronchitis, pneumonia), with fever and antibiotic treatment 0 filed at 04/07/2025 1203   Preoperative anemia (Hgb less than 10 g/dl) 0 filed at 04/07/2025 1203   Surgical incision  0 filed at 04/07/2025 1203   Duration of surgery  16 filed at 04/07/2025 1203   Emergency Procedure  0 filed at 04/07/2025 1203   ARISCAT Total Score  19 filed at 04/07/2025 1203          Vargas Perioperative Risk for Myocardial Infarction or Cardiac Arrest (JOEL)      Flowsheet Row Pre-Admission Testing from 4/7/2025 in [facility]   Calculated Age Score 1.28 filed at 04/07/2025 1203   Functional Status  0 filed at 04/07/2025 1203   ASA Class  -3.29 filed at 04/07/2025 1203   Creatinine 0 filed at 04/07/2025 1203   Type of Procedure  0.80 filed at 04/07/2025 1203   JOEL Total Score  -6.46 filed at 04/07/2025 1203   JOEL % 0.16 filed at 04/07/2025 1203          Assessment & Plan:    Neuro:  No diagnosis or significant findings on chart review or clinical presentation and evaluation.     HEENT/Airway:  No diagnosis or significant findings on chart review or clinical presentation and evaluation.   STOP-BANG Score-3 points moderate risk for JEN    Mallampati::  II    TM distance::  >3 FB    Neck ROM::   Full  Dentures-denies  Crowns-reports x 2  Implants-reports x 1    Cardiovascular:  No diagnosis or significant findings on chart review or clinical presentation and evaluation.   METS: 6.5  RCRI: 0 points, 3.9%  risk for postoperative MACE   JOEL: 0.16% risk for postoperative MACE  EKG -Sinus bradycardia with nonspecific ST and T wave changes Rate-48 No acute changes.           Pulmonary:  No diagnosis or significant findings on chart review or clinical presentation and evaluation.   ARISCAT: <26 points, 1.6% risk of in-hospital postoperative pulmonary complication  PRODIGY: Low risk for opioid induced respiratory depression  Smoking History-She has never smoked.  Discussed smoking cessation and deep breathing handout given    Renal/Urinary:  No diagnosis or significant findings on chart review or clinical presentation and evaluation, however, the patient is at increased risk of perioperative renal complications secondary to age>/= 56. Preventative measures include BP monitoring, medication compliance, and hydration management.   CMP-Reviewed, stable  Creatinine-0.74  GFR-90    Endocrine:  No significant findings on chart review or clinical presentation and evaluation.   History of Hypothyroidism-Managed with Levothyroxine.   TSH-1.50    ILF4T-5.7%    Hematologic/Immunology:  No diagnosis or significant findings on chart review or clinical presentation and evaluation.  The patient is not a Jehovah’s witness and will accept blood and blood products if medically indicated.   History of previous blood transfusions No  CBC-reviewed, stable  HGB-12.9  Caprini Score 13, patient at High for postoperative DVT. Pt supplied education/VTE handout  Anticoagulation use: No     Gastrointestinal:   No diagnosis or significant findings on chart review or clinical presentation and evaluation.   Recreational drug use: alcohol  Alcohol use social drinker    Infectious disease:   No diagnosis or significant findings on chart review or  clinical presentation and evaluation.   Prescription provided for CHG body wash and dental rinse. CHG use instructions reviewed and provided to patient.  Staph screen collected-Positive for MSSA    Musculoskeletal:   No diagnosis on chart review or clinical presentation and evaluation.   Positive for Obesity-BMI 39.12  JHFRAT score-3 points. low risk for falls    Anesthesia:  ASA 2 - Patient with mild systemic disease with no functional limitations  Anticipated anesthesia-Consult  History of General anesthesia- yes  Complications- No anesthesia complications  No family history of anesthesia complications    Labs & Imaging ordered:  CBC, CMP, HBA1C, MRSA, EKG  Nickel/metal allergy-negative  Shellfish allergy-positive    Overall, patient Low Risk for the scheduled Moderate Risk surgery. Discussed with patient medication instructions, NPO guidelines, and any questions or concerns.     Face to face time spent 45 minutes

## 2025-04-07 NOTE — GROUP NOTE
In addition to the group class activities, Dora Trimble had the following lab work completed:  No orders of the defined types were placed in this encounter.      A new History and Physical was not completed.    This class lasted approximately 2 hours and had 7 participants. The nurse instructor covered the following topics:    MyChart Enrollment  Communication with Care Team  My Chart is the best form of communication to reach all of your caregivers  You can send messages to specific care givers, or a care team  Continued Education  You will be enrolled in a Joint Replacement care plan to receive additional education before and after surgery  You can review a short recording of the class content - [email or web address]  Access to Medical Records  You can access test results, office notes, appointments, etc.  You can connect to other healthcare systems who use [employer] ([facility], [facility], [facility], etc.)  [employer]  Program Information  Opportunity to Opt Out    Background/Understanding of Joint Replacement Surgery  Potential for same day discharge  Any questions or concerns to be directed to the surgeon's office  Not all patients are appropriate for same day discharge  All patients will be required to meet discharge criteria prior to leaving our care    How to Prepare for Surgery  Use of Recreational Products (Nicotine, Alcohol, THC, CBD, Drugs, Etc.)  The ultimate goal is to quit using thee products!  Stop several weeks before surgery  Such products slow down the healing process and increase risk of post-op infection and complications  Clearance for Surgery  Preadmission Testing - Appropriateness for anesthesia  Medical Clearance by Specialists  Dental Clearance  Cracked/Broken/Loose teeth left untreated may postpone surgery  The importance of post-op antibiotics for dental visits per surgeon protocol  Preadmission Testing  **Potential for postponed surgery if appropriate clearance is not  obtained  Medication Instruction  Follow instructions provided by the doctor who prescribes your medication (typically, but not limited to cardiologist)  Preadmission testing will provide additional instructions during your appointment on what to stop and what to take as you get closer to surgery  For clarification of these instructions, please call preadmission testing directly - [phone]  Tips for Preparing the home for discharge from the hospital  Care Partner  Requirement for surgery, the patient must have a plan to have help at home  Potential for postponed surgery if plan for home support cannot be established  Your Care Partner does not need medical training  How the care partner can help after surgery  CHG Body Wash/Mouth Wash  Follow the instructions given at preadmission testing  Body wash is to be used on the body and hair for 5 washes  Mouthwash is to be used the night before and morning of surgery  **This is a system-wide protocol developed by infectious disease professionals, we will not alter our recommendations for those with sensitive skin or those who have special hair needs.  Please follow the instructions as they are written as this will provide the best infection prevention measures for surgery.  Should you have an allergy to one of the products, please discuss with your preadmission team**    What to Expect in the Hospital/At Home  Morning of Surgery NPO Guidelines  Nothing to eat after midnight  Water can be consumed up to 2 hours prior to arrival  Surgical and Post-Surgical Care Team  Surgical Team  Anesthesia Team  Nursing  Physical Therapy  Care Coordinating  Pharmacy  Hospital Arrival Instructions  Arrive at the time provided to you  Consider traffic patterns (rush-hour) based on arrival time  Have arrangements made for a ride home  If discharging same day, care partner should remain at the hospital  Recovering after Surgery  Recovery Room - Visitors are not brought back  Transition to  hospital room - 2nd Floor, Visitors will be directed to your room  The presence of and strategies for controlling surgical pain and swelling  The importance of early mobility  Side effects after surgery  What to expect if staying overnight    Discharge Planning  The intended plan for discharge will be for patients to discharge home  All patients require a care partner (family, friend, neighbor, etc.) to stay with the patient for the first few nights after surgery  The inability to secure help at home may postpone surgery  Home Care Services set up per surgeon order  Physical Therapy  Occupational Therapy  **If desired, private duty care can be arranged by the patient ahead of time**  Outpatient Physical Therapy per surgeon order    Recovering at Home  Wound Care  Follow wound care instructions found in your discharge paperwork  Bandage is water-resistant and you may shower with the bandage  Do not scrub directly over the bandage  Do not submerge in water until cleared (bathtub, hot tub, pool, etc.)    Post-Op Risk Prevention  Infection Prevention  Promptly seek treatment for any infections post-operatively  Routine dental visits must be postponed for 3 months after surgery  Your surgeon may require antibiotics prior to future dental visits  Any concerns for infection not related directly to the knee or the hip should be managed by your primary care provider  Blood Clots  Be sure to complete the course of blood thinning medication as prescribed by your surgeon  Movement every 1-2 hours during the day is encouraged to prevent blood clots  Monitor for signs of blood clots  Wear compression stockings as prescribed by your surgeon  Constipation  Constipation is common following surgery  Drink plenty of fluids  Take stool softener/laxative as prescribed by your surgeon  Move around frequently  Eat foods high in fiber  Fall Prevention  Prepare home ahead of time to clear space to move with walker  Remove throw rugs and  electrical cords from walkways  Install railings near any stairways with more than 2 steps  Use night lights for increased visibility at night  Continue to use your assistive device until cleared by surgeon or physical therapy  Dislocation Prevention - Not all procedures will have dislocation precautions  Follow dislocation precautions provided by your surgeon  It is OK to resume sexual activity about 6 weeks following surgery  Be sure to follow any dislocation precautions assigned    Durable Medical Equipment  Cold Therapy  Breg Cold Therapy Machines  Ice/Gel Packs  Assistive Devices  Folding Walker with Wheels (in the front only)  No Rollators  Crutches if approved by Physical Therapy and Surgeon after surgery  Hip Kits  Raised Toilet Seats  Additional Compression Stockings    Joint Preservation  Healthy Activities when Cleared  Walking  Swimming  Bike Riding  Activities to Avoid  Refrain from repetitive motions which have a high impact on the joint  Gradual Progression  Progress activity slowly, listen to your body  Common Findings - NORMAL after surgery  Clicking/Grinding  Numbness near incision    Physical Therapy  Prehabilitation exercises  START TODAY ON BOTH LEGS  Surgery Specific Precautions  Follow surgery specific precautions found in your discharge paperwork    Follow-Up Visit  All patients will see their surgeon for a follow up visit after surgery  The visit may range from 2-6 weeks after surgery and is surgeon specific      Please don't hesitate to reach out if you have any additional questions or concerns.    Mireille Escalante MBA, BSN, RN-BC, ONC  CHENG Durand, RN  Rach Doherty, ZAFAR  Orthopedic Program Navigators  [facility]   [phone]

## 2025-04-08 LAB
EST. AVERAGE GLUCOSE BLD GHB EST-MCNC: 117 MG/DL
HBA1C MFR BLD: 5.7 %

## 2025-04-09 LAB — STAPHYLOCOCCUS SPEC CULT: ABNORMAL

## 2025-04-21 ENCOUNTER — APPOINTMENT (OUTPATIENT)
Dept: PHARMACY | Facility: HOSPITAL | Age: 65
End: 2025-04-21
Payer: COMMERCIAL

## 2025-04-28 ENCOUNTER — ANESTHESIA (OUTPATIENT)
Dept: OPERATING ROOM | Facility: HOSPITAL | Age: 65
End: 2025-04-28
Payer: MEDICARE

## 2025-04-28 ENCOUNTER — HOSPITAL ENCOUNTER (OUTPATIENT)
Facility: HOSPITAL | Age: 65
Setting detail: OUTPATIENT SURGERY
Discharge: HOME HEALTH | End: 2025-04-28
Attending: ORTHOPAEDIC SURGERY | Admitting: ORTHOPAEDIC SURGERY
Payer: MEDICARE

## 2025-04-28 ENCOUNTER — APPOINTMENT (OUTPATIENT)
Dept: RADIOLOGY | Facility: HOSPITAL | Age: 65
End: 2025-04-28
Payer: MEDICARE

## 2025-04-28 ENCOUNTER — DOCUMENTATION (OUTPATIENT)
Dept: HOME HEALTH SERVICES | Facility: HOME HEALTH | Age: 65
End: 2025-04-28
Payer: MEDICARE

## 2025-04-28 ENCOUNTER — HOME HEALTH ADMISSION (OUTPATIENT)
Dept: HOME HEALTH SERVICES | Facility: HOME HEALTH | Age: 65
End: 2025-04-28
Payer: MEDICARE

## 2025-04-28 ENCOUNTER — ANESTHESIA EVENT (OUTPATIENT)
Dept: OPERATING ROOM | Facility: HOSPITAL | Age: 65
End: 2025-04-28
Payer: MEDICARE

## 2025-04-28 ENCOUNTER — PHARMACY VISIT (OUTPATIENT)
Dept: PHARMACY | Facility: CLINIC | Age: 65
End: 2025-04-28
Payer: COMMERCIAL

## 2025-04-28 VITALS
BODY MASS INDEX: 39.63 KG/M2 | SYSTOLIC BLOOD PRESSURE: 141 MMHG | HEART RATE: 61 BPM | DIASTOLIC BLOOD PRESSURE: 77 MMHG | OXYGEN SATURATION: 99 % | HEIGHT: 65 IN | WEIGHT: 237.88 LBS | TEMPERATURE: 97.3 F | RESPIRATION RATE: 14 BRPM

## 2025-04-28 DIAGNOSIS — M16.12 UNILATERAL PRIMARY OSTEOARTHRITIS, LEFT HIP: Primary | ICD-10-CM

## 2025-04-28 PROCEDURE — 3600000010 HC OR TIME - EACH INCREMENTAL 1 MINUTE - PROCEDURE LEVEL FIVE: Performed by: ORTHOPAEDIC SURGERY

## 2025-04-28 PROCEDURE — RXMED WILLOW AMBULATORY MEDICATION CHARGE

## 2025-04-28 PROCEDURE — 7100000001 HC RECOVERY ROOM TIME - INITIAL BASE CHARGE: Performed by: ORTHOPAEDIC SURGERY

## 2025-04-28 PROCEDURE — 2500000004 HC RX 250 GENERAL PHARMACY W/ HCPCS (ALT 636 FOR OP/ED): Performed by: PHYSICIAN ASSISTANT

## 2025-04-28 PROCEDURE — 97110 THERAPEUTIC EXERCISES: CPT | Mod: GP

## 2025-04-28 PROCEDURE — 2780000003 HC OR 278 NO HCPCS: Performed by: ORTHOPAEDIC SURGERY

## 2025-04-28 PROCEDURE — 7100000011 HC EXTENDED STAY RECOVERY HOURLY - NURSING UNIT

## 2025-04-28 PROCEDURE — 27130 TOTAL HIP ARTHROPLASTY: CPT | Performed by: ORTHOPAEDIC SURGERY

## 2025-04-28 PROCEDURE — 7100000010 HC PHASE TWO TIME - EACH INCREMENTAL 1 MINUTE: Performed by: ORTHOPAEDIC SURGERY

## 2025-04-28 PROCEDURE — 7100000009 HC PHASE TWO TIME - INITIAL BASE CHARGE: Performed by: ORTHOPAEDIC SURGERY

## 2025-04-28 PROCEDURE — 3700000002 HC GENERAL ANESTHESIA TIME - EACH INCREMENTAL 1 MINUTE: Performed by: ORTHOPAEDIC SURGERY

## 2025-04-28 PROCEDURE — 3600000005 HC OR TIME - INITIAL BASE CHARGE - PROCEDURE LEVEL FIVE: Performed by: ORTHOPAEDIC SURGERY

## 2025-04-28 PROCEDURE — 72170 X-RAY EXAM OF PELVIS: CPT

## 2025-04-28 PROCEDURE — 72170 X-RAY EXAM OF PELVIS: CPT | Performed by: RADIOLOGY

## 2025-04-28 PROCEDURE — 2500000005 HC RX 250 GENERAL PHARMACY W/O HCPCS: Performed by: ANESTHESIOLOGIST ASSISTANT

## 2025-04-28 PROCEDURE — 27130 TOTAL HIP ARTHROPLASTY: CPT | Performed by: PHYSICIAN ASSISTANT

## 2025-04-28 PROCEDURE — 2500000001 HC RX 250 WO HCPCS SELF ADMINISTERED DRUGS (ALT 637 FOR MEDICARE OP): Performed by: PHYSICIAN ASSISTANT

## 2025-04-28 PROCEDURE — C1713 ANCHOR/SCREW BN/BN,TIS/BN: HCPCS | Performed by: ORTHOPAEDIC SURGERY

## 2025-04-28 PROCEDURE — 2720000007 HC OR 272 NO HCPCS: Performed by: ORTHOPAEDIC SURGERY

## 2025-04-28 PROCEDURE — 97161 PT EVAL LOW COMPLEX 20 MIN: CPT | Mod: GP

## 2025-04-28 PROCEDURE — 2500000005 HC RX 250 GENERAL PHARMACY W/O HCPCS: Performed by: PHYSICIAN ASSISTANT

## 2025-04-28 PROCEDURE — 3700000001 HC GENERAL ANESTHESIA TIME - INITIAL BASE CHARGE: Performed by: ORTHOPAEDIC SURGERY

## 2025-04-28 PROCEDURE — 2500000004 HC RX 250 GENERAL PHARMACY W/ HCPCS (ALT 636 FOR OP/ED): Mod: JZ | Performed by: ORTHOPAEDIC SURGERY

## 2025-04-28 PROCEDURE — A27130 PR TOTAL HIP ARTHROPLASTY: Performed by: ANESTHESIOLOGIST ASSISTANT

## 2025-04-28 PROCEDURE — A4649 SURGICAL SUPPLIES: HCPCS | Performed by: ORTHOPAEDIC SURGERY

## 2025-04-28 PROCEDURE — 2500000004 HC RX 250 GENERAL PHARMACY W/ HCPCS (ALT 636 FOR OP/ED): Performed by: ANESTHESIOLOGIST ASSISTANT

## 2025-04-28 PROCEDURE — C1776 JOINT DEVICE (IMPLANTABLE): HCPCS | Performed by: ORTHOPAEDIC SURGERY

## 2025-04-28 PROCEDURE — A27130 PR TOTAL HIP ARTHROPLASTY: Performed by: STUDENT IN AN ORGANIZED HEALTH CARE EDUCATION/TRAINING PROGRAM

## 2025-04-28 PROCEDURE — 2500000004 HC RX 250 GENERAL PHARMACY W/ HCPCS (ALT 636 FOR OP/ED): Performed by: STUDENT IN AN ORGANIZED HEALTH CARE EDUCATION/TRAINING PROGRAM

## 2025-04-28 PROCEDURE — 7100000002 HC RECOVERY ROOM TIME - EACH INCREMENTAL 1 MINUTE: Performed by: ORTHOPAEDIC SURGERY

## 2025-04-28 PROCEDURE — 97116 GAIT TRAINING THERAPY: CPT | Mod: GP

## 2025-04-28 DEVICE — ARTICUL/EZE FEMORAL HEAD CERAMIC 12/14 TAPER 36MM MINUS  2
Type: IMPLANTABLE DEVICE | Site: HIP | Status: FUNCTIONAL
Brand: ARTICUL/EZE

## 2025-04-28 DEVICE — PINNACLE HIP SOLUTIONS ALTRX POLYETHYLENE ACETABULAR LINER NEUTRAL 36MM ID 54MM OD
Type: IMPLANTABLE DEVICE | Site: HIP | Status: FUNCTIONAL
Brand: PINNACLE ALTRX

## 2025-04-28 DEVICE — PINNACLE GRIPTION ACETABULAR SHELL SECTOR 54MM OD
Type: IMPLANTABLE DEVICE | Site: HIP | Status: FUNCTIONAL
Brand: PINNACLE GRIPTION

## 2025-04-28 DEVICE — PINNACLE CANCELLOUS BONE SCREW 6.5MM X 30MM
Type: IMPLANTABLE DEVICE | Site: HIP | Status: FUNCTIONAL
Brand: PINNACLE

## 2025-04-28 DEVICE — PINNACLE CANCELLOUS BONE SCREW 6.5MM X 25MM
Type: IMPLANTABLE DEVICE | Site: HIP | Status: FUNCTIONAL
Brand: PINNACLE

## 2025-04-28 DEVICE — SUMMIT FEMORAL STEM 12/14 TAPER TAPER ED W/POROCOAT SIZE 5 STD 145MM
Type: IMPLANTABLE DEVICE | Site: HIP | Status: FUNCTIONAL
Brand: SUMMIT POROCOAT

## 2025-04-28 RX ORDER — SCOPOLAMINE 1 MG/3D
1 PATCH, EXTENDED RELEASE TRANSDERMAL
Status: DISCONTINUED | OUTPATIENT
Start: 2025-04-28 | End: 2025-04-28 | Stop reason: HOSPADM

## 2025-04-28 RX ORDER — OXYCODONE HYDROCHLORIDE 5 MG/1
5 TABLET ORAL EVERY 4 HOURS PRN
Status: DISCONTINUED | OUTPATIENT
Start: 2025-04-28 | End: 2025-04-28 | Stop reason: HOSPADM

## 2025-04-28 RX ORDER — OXYCODONE HCL 10 MG/1
10 TABLET, FILM COATED, EXTENDED RELEASE ORAL ONCE
Status: COMPLETED | OUTPATIENT
Start: 2025-04-28 | End: 2025-04-28

## 2025-04-28 RX ORDER — CEFAZOLIN SODIUM 2 G/100ML
2 INJECTION, SOLUTION INTRAVENOUS ONCE
Status: COMPLETED | OUTPATIENT
Start: 2025-04-28 | End: 2025-04-28

## 2025-04-28 RX ORDER — PROPOFOL 10 MG/ML
INJECTION, EMULSION INTRAVENOUS AS NEEDED
Status: DISCONTINUED | OUTPATIENT
Start: 2025-04-28 | End: 2025-04-28

## 2025-04-28 RX ORDER — PREGABALIN 75 MG/1
75 CAPSULE ORAL ONCE
Status: COMPLETED | OUTPATIENT
Start: 2025-04-28 | End: 2025-04-28

## 2025-04-28 RX ORDER — PANTOPRAZOLE SODIUM 40 MG/1
40 TABLET, DELAYED RELEASE ORAL
Status: DISCONTINUED | OUTPATIENT
Start: 2025-04-29 | End: 2025-04-28 | Stop reason: HOSPADM

## 2025-04-28 RX ORDER — OXYCODONE HYDROCHLORIDE 5 MG/1
10 TABLET ORAL EVERY 4 HOURS PRN
Status: DISCONTINUED | OUTPATIENT
Start: 2025-04-28 | End: 2025-04-28 | Stop reason: HOSPADM

## 2025-04-28 RX ORDER — MIDAZOLAM HYDROCHLORIDE 1 MG/ML
INJECTION, SOLUTION INTRAMUSCULAR; INTRAVENOUS AS NEEDED
Status: DISCONTINUED | OUTPATIENT
Start: 2025-04-28 | End: 2025-04-28

## 2025-04-28 RX ORDER — OXYCODONE HYDROCHLORIDE 5 MG/1
5 TABLET ORAL EVERY 6 HOURS PRN
Status: DISCONTINUED | OUTPATIENT
Start: 2025-04-28 | End: 2025-04-28 | Stop reason: HOSPADM

## 2025-04-28 RX ORDER — MELOXICAM 7.5 MG/1
7.5 TABLET ORAL ONCE
Status: COMPLETED | OUTPATIENT
Start: 2025-04-28 | End: 2025-04-28

## 2025-04-28 RX ORDER — SODIUM CHLORIDE, SODIUM LACTATE, POTASSIUM CHLORIDE, CALCIUM CHLORIDE 600; 310; 30; 20 MG/100ML; MG/100ML; MG/100ML; MG/100ML
75 INJECTION, SOLUTION INTRAVENOUS CONTINUOUS
Status: DISCONTINUED | OUTPATIENT
Start: 2025-04-28 | End: 2025-04-28 | Stop reason: HOSPADM

## 2025-04-28 RX ORDER — ONDANSETRON 4 MG/1
4 TABLET, ORALLY DISINTEGRATING ORAL EVERY 8 HOURS PRN
Status: DISCONTINUED | OUTPATIENT
Start: 2025-04-28 | End: 2025-04-28 | Stop reason: HOSPADM

## 2025-04-28 RX ORDER — ACETAMINOPHEN 325 MG/1
650 TABLET ORAL EVERY 4 HOURS PRN
Status: DISCONTINUED | OUTPATIENT
Start: 2025-04-28 | End: 2025-04-28 | Stop reason: HOSPADM

## 2025-04-28 RX ORDER — NALOXONE HYDROCHLORIDE 0.4 MG/ML
0.2 INJECTION, SOLUTION INTRAMUSCULAR; INTRAVENOUS; SUBCUTANEOUS EVERY 5 MIN PRN
Status: DISCONTINUED | OUTPATIENT
Start: 2025-04-28 | End: 2025-04-28 | Stop reason: HOSPADM

## 2025-04-28 RX ORDER — HYDROMORPHONE HYDROCHLORIDE 2 MG/ML
INJECTION, SOLUTION INTRAMUSCULAR; INTRAVENOUS; SUBCUTANEOUS AS NEEDED
Status: DISCONTINUED | OUTPATIENT
Start: 2025-04-28 | End: 2025-04-28

## 2025-04-28 RX ORDER — METOCLOPRAMIDE HYDROCHLORIDE 5 MG/ML
10 INJECTION INTRAMUSCULAR; INTRAVENOUS EVERY 6 HOURS PRN
Status: DISCONTINUED | OUTPATIENT
Start: 2025-04-28 | End: 2025-04-28 | Stop reason: HOSPADM

## 2025-04-28 RX ORDER — ONDANSETRON HYDROCHLORIDE 2 MG/ML
INJECTION, SOLUTION INTRAVENOUS AS NEEDED
Status: DISCONTINUED | OUTPATIENT
Start: 2025-04-28 | End: 2025-04-28

## 2025-04-28 RX ORDER — ALBUTEROL SULFATE 0.83 MG/ML
2.5 SOLUTION RESPIRATORY (INHALATION) ONCE AS NEEDED
Status: DISCONTINUED | OUTPATIENT
Start: 2025-04-28 | End: 2025-04-28 | Stop reason: HOSPADM

## 2025-04-28 RX ORDER — ACETAMINOPHEN 325 MG/1
975 TABLET ORAL ONCE
Status: COMPLETED | OUTPATIENT
Start: 2025-04-28 | End: 2025-04-28

## 2025-04-28 RX ORDER — OXYCODONE HYDROCHLORIDE 5 MG/1
5 TABLET ORAL EVERY 6 HOURS PRN
Qty: 28 TABLET | Refills: 0 | Status: SHIPPED | OUTPATIENT
Start: 2025-04-28 | End: 2025-05-05

## 2025-04-28 RX ORDER — CEFAZOLIN SODIUM 2 G/100ML
2 INJECTION, SOLUTION INTRAVENOUS EVERY 8 HOURS
Status: DISCONTINUED | OUTPATIENT
Start: 2025-04-28 | End: 2025-04-28 | Stop reason: HOSPADM

## 2025-04-28 RX ORDER — HYDROMORPHONE HYDROCHLORIDE 0.2 MG/ML
0.2 INJECTION INTRAMUSCULAR; INTRAVENOUS; SUBCUTANEOUS EVERY 5 MIN PRN
Status: DISCONTINUED | OUTPATIENT
Start: 2025-04-28 | End: 2025-04-28 | Stop reason: HOSPADM

## 2025-04-28 RX ORDER — SODIUM CHLORIDE, SODIUM LACTATE, POTASSIUM CHLORIDE, CALCIUM CHLORIDE 600; 310; 30; 20 MG/100ML; MG/100ML; MG/100ML; MG/100ML
100 INJECTION, SOLUTION INTRAVENOUS CONTINUOUS
Status: CANCELLED | OUTPATIENT
Start: 2025-04-28 | End: 2025-04-29

## 2025-04-28 RX ORDER — CYCLOBENZAPRINE HCL 10 MG
5 TABLET ORAL 3 TIMES DAILY PRN
Status: DISCONTINUED | OUTPATIENT
Start: 2025-04-28 | End: 2025-04-28 | Stop reason: HOSPADM

## 2025-04-28 RX ORDER — DOCUSATE SODIUM 100 MG/1
100 CAPSULE, LIQUID FILLED ORAL 2 TIMES DAILY
Qty: 60 CAPSULE | Refills: 0 | Status: SHIPPED | OUTPATIENT
Start: 2025-04-28 | End: 2025-05-28

## 2025-04-28 RX ORDER — ASPIRIN 81 MG/1
81 TABLET ORAL 2 TIMES DAILY
Status: DISCONTINUED | OUTPATIENT
Start: 2025-04-29 | End: 2025-04-28 | Stop reason: HOSPADM

## 2025-04-28 RX ORDER — BISACODYL 10 MG/1
10 SUPPOSITORY RECTAL DAILY PRN
Status: DISCONTINUED | OUTPATIENT
Start: 2025-04-28 | End: 2025-04-28 | Stop reason: HOSPADM

## 2025-04-28 RX ORDER — PANTOPRAZOLE SODIUM 40 MG/1
40 TABLET, DELAYED RELEASE ORAL DAILY
Qty: 30 TABLET | Refills: 0 | Status: SHIPPED | OUTPATIENT
Start: 2025-04-28 | End: 2025-05-28

## 2025-04-28 RX ORDER — PHENYLEPHRINE 10 MG/250 ML(40 MCG/ML)IN 0.9 % SOD.CHLORIDE INTRAVENOUS
CONTINUOUS PRN
Status: DISCONTINUED | OUTPATIENT
Start: 2025-04-28 | End: 2025-04-28

## 2025-04-28 RX ORDER — ONDANSETRON HYDROCHLORIDE 2 MG/ML
4 INJECTION, SOLUTION INTRAVENOUS ONCE AS NEEDED
Status: COMPLETED | OUTPATIENT
Start: 2025-04-28 | End: 2025-04-28

## 2025-04-28 RX ORDER — ACETAMINOPHEN 500 MG
1000 TABLET ORAL EVERY 6 HOURS PRN
Qty: 240 TABLET | Refills: 0 | Status: SHIPPED | OUTPATIENT
Start: 2025-04-28 | End: 2025-05-28

## 2025-04-28 RX ORDER — LEVOTHYROXINE SODIUM 100 UG/1
100 TABLET ORAL DAILY
Status: DISCONTINUED | OUTPATIENT
Start: 2025-04-29 | End: 2025-04-28 | Stop reason: HOSPADM

## 2025-04-28 RX ORDER — KETOROLAC TROMETHAMINE 15 MG/ML
15 INJECTION, SOLUTION INTRAMUSCULAR; INTRAVENOUS EVERY 6 HOURS
Status: DISCONTINUED | OUTPATIENT
Start: 2025-04-28 | End: 2025-04-28 | Stop reason: HOSPADM

## 2025-04-28 RX ORDER — CEPHALEXIN 500 MG/1
500 CAPSULE ORAL 2 TIMES DAILY
Qty: 20 CAPSULE | Refills: 0 | Status: SHIPPED | OUTPATIENT
Start: 2025-04-28 | End: 2025-05-08

## 2025-04-28 RX ORDER — SODIUM CHLORIDE, SODIUM LACTATE, POTASSIUM CHLORIDE, CALCIUM CHLORIDE 600; 310; 30; 20 MG/100ML; MG/100ML; MG/100ML; MG/100ML
50 INJECTION, SOLUTION INTRAVENOUS CONTINUOUS
Status: DISCONTINUED | OUTPATIENT
Start: 2025-04-28 | End: 2025-04-28 | Stop reason: HOSPADM

## 2025-04-28 RX ORDER — MELOXICAM 15 MG/1
15 TABLET ORAL DAILY
Qty: 30 TABLET | Refills: 0 | Status: SHIPPED | OUTPATIENT
Start: 2025-04-28 | End: 2025-05-28

## 2025-04-28 RX ORDER — TRANEXAMIC ACID 100 MG/ML
INJECTION, SOLUTION INTRAVENOUS AS NEEDED
Status: DISCONTINUED | OUTPATIENT
Start: 2025-04-28 | End: 2025-04-28

## 2025-04-28 RX ORDER — PHENYLEPHRINE HCL IN 0.9% NACL 1 MG/10 ML
SYRINGE (ML) INTRAVENOUS AS NEEDED
Status: DISCONTINUED | OUTPATIENT
Start: 2025-04-28 | End: 2025-04-28

## 2025-04-28 RX ORDER — ASPIRIN 81 MG/1
81 TABLET ORAL 2 TIMES DAILY
Qty: 60 TABLET | Refills: 0 | Status: SHIPPED | OUTPATIENT
Start: 2025-04-28 | End: 2025-05-28

## 2025-04-28 RX ORDER — TRAMADOL HYDROCHLORIDE 50 MG/1
50 TABLET ORAL EVERY 6 HOURS PRN
Qty: 28 TABLET | Refills: 0 | Status: SHIPPED | OUTPATIENT
Start: 2025-04-28 | End: 2025-05-05

## 2025-04-28 RX ORDER — BISACODYL 5 MG
10 TABLET, DELAYED RELEASE (ENTERIC COATED) ORAL DAILY PRN
Status: DISCONTINUED | OUTPATIENT
Start: 2025-04-28 | End: 2025-04-28 | Stop reason: HOSPADM

## 2025-04-28 RX ORDER — POLYETHYLENE GLYCOL 3350 17 G/17G
17 POWDER, FOR SOLUTION ORAL DAILY
Status: DISCONTINUED | OUTPATIENT
Start: 2025-04-28 | End: 2025-04-28 | Stop reason: HOSPADM

## 2025-04-28 RX ORDER — DOCUSATE SODIUM 100 MG/1
100 CAPSULE, LIQUID FILLED ORAL 2 TIMES DAILY
Status: DISCONTINUED | OUTPATIENT
Start: 2025-04-28 | End: 2025-04-28 | Stop reason: HOSPADM

## 2025-04-28 RX ORDER — SODIUM CHLORIDE, SODIUM LACTATE, POTASSIUM CHLORIDE, CALCIUM CHLORIDE 600; 310; 30; 20 MG/100ML; MG/100ML; MG/100ML; MG/100ML
INJECTION, SOLUTION INTRAVENOUS CONTINUOUS PRN
Status: DISCONTINUED | OUTPATIENT
Start: 2025-04-28 | End: 2025-04-28

## 2025-04-28 RX ORDER — ONDANSETRON HYDROCHLORIDE 2 MG/ML
4 INJECTION, SOLUTION INTRAVENOUS EVERY 8 HOURS PRN
Status: DISCONTINUED | OUTPATIENT
Start: 2025-04-28 | End: 2025-04-28 | Stop reason: HOSPADM

## 2025-04-28 RX ORDER — ROPIVACAINE/EPI/CLONIDINE/KET 2.46-0.005
SYRINGE (ML) INJECTION AS NEEDED
Status: DISCONTINUED | OUTPATIENT
Start: 2025-04-28 | End: 2025-04-28 | Stop reason: HOSPADM

## 2025-04-28 RX ORDER — METOCLOPRAMIDE 10 MG/1
10 TABLET ORAL EVERY 6 HOURS PRN
Status: DISCONTINUED | OUTPATIENT
Start: 2025-04-28 | End: 2025-04-28 | Stop reason: HOSPADM

## 2025-04-28 RX ORDER — POLYETHYLENE GLYCOL 3350 17 G/17G
17 POWDER, FOR SOLUTION ORAL DAILY
Qty: 510 G | Refills: 0 | Status: SHIPPED | OUTPATIENT
Start: 2025-04-28

## 2025-04-28 RX ADMIN — PROPOFOL 20 MG: 10 INJECTION, EMULSION INTRAVENOUS at 10:38

## 2025-04-28 RX ADMIN — ONDANSETRON 4 MG: 2 INJECTION, SOLUTION INTRAMUSCULAR; INTRAVENOUS at 11:51

## 2025-04-28 RX ADMIN — ACETAMINOPHEN 975 MG: 325 TABLET ORAL at 08:35

## 2025-04-28 RX ADMIN — TRANEXAMIC ACID 1000 MG: 100 INJECTION, SOLUTION INTRAVENOUS at 10:29

## 2025-04-28 RX ADMIN — SCOPOLAMINE 1 PATCH: 1.5 PATCH, EXTENDED RELEASE TRANSDERMAL at 08:36

## 2025-04-28 RX ADMIN — HYDROMORPHONE HYDROCHLORIDE 0.4 MG: 2 INJECTION, SOLUTION INTRAMUSCULAR; INTRAVENOUS; SUBCUTANEOUS at 12:16

## 2025-04-28 RX ADMIN — TRANEXAMIC ACID 1000 MG: 100 INJECTION, SOLUTION INTRAVENOUS at 11:51

## 2025-04-28 RX ADMIN — CEFAZOLIN SODIUM 2 G: 2 INJECTION, SOLUTION INTRAVENOUS at 10:20

## 2025-04-28 RX ADMIN — MEPIVACAINE HYDROCHLORIDE 3.6 ML: 15 INJECTION, SOLUTION EPIDURAL; INFILTRATION at 10:21

## 2025-04-28 RX ADMIN — OXYCODONE HYDROCHLORIDE 10 MG: 10 TABLET, FILM COATED, EXTENDED RELEASE ORAL at 08:36

## 2025-04-28 RX ADMIN — DEXAMETHASONE SODIUM PHOSPHATE 4 MG: 4 INJECTION INTRA-ARTICULAR; INTRALESIONAL; INTRAMUSCULAR; INTRAVENOUS; SOFT TISSUE at 10:43

## 2025-04-28 RX ADMIN — POVIDONE-IODINE 1 APPLICATION: 5 SOLUTION TOPICAL at 08:35

## 2025-04-28 RX ADMIN — Medication 50 MCG: at 11:21

## 2025-04-28 RX ADMIN — PROPOFOL 55 MCG/KG/MIN: 10 INJECTION, EMULSION INTRAVENOUS at 10:27

## 2025-04-28 RX ADMIN — MIDAZOLAM 2 MG: 1 INJECTION INTRAMUSCULAR; INTRAVENOUS at 10:11

## 2025-04-28 RX ADMIN — Medication 50 MCG: at 11:24

## 2025-04-28 RX ADMIN — Medication 50 MCG: at 11:05

## 2025-04-28 RX ADMIN — ONDANSETRON 4 MG: 2 INJECTION, SOLUTION INTRAMUSCULAR; INTRAVENOUS at 14:50

## 2025-04-28 RX ADMIN — PHENYLEPHRINE-NACL IV SOLUTION 10 MG/250ML-0.9% 0.2 MCG/KG/MIN: 10-0.9/25 SOLUTION at 11:24

## 2025-04-28 RX ADMIN — HYDROMORPHONE HYDROCHLORIDE 0.4 MG: 1 INJECTION, SOLUTION INTRAMUSCULAR; INTRAVENOUS; SUBCUTANEOUS at 13:14

## 2025-04-28 RX ADMIN — PREGABALIN 75 MG: 75 CAPSULE ORAL at 08:36

## 2025-04-28 RX ADMIN — SODIUM CHLORIDE, POTASSIUM CHLORIDE, SODIUM LACTATE AND CALCIUM CHLORIDE: 600; 310; 30; 20 INJECTION, SOLUTION INTRAVENOUS at 10:00

## 2025-04-28 RX ADMIN — PROPOFOL 40 MG: 10 INJECTION, EMULSION INTRAVENOUS at 10:26

## 2025-04-28 RX ADMIN — MIDAZOLAM 2 MG: 1 INJECTION INTRAMUSCULAR; INTRAVENOUS at 10:07

## 2025-04-28 RX ADMIN — Medication 100 MCG: at 10:52

## 2025-04-28 RX ADMIN — HYDROMORPHONE HYDROCHLORIDE 0.6 MG: 2 INJECTION, SOLUTION INTRAMUSCULAR; INTRAVENOUS; SUBCUTANEOUS at 12:14

## 2025-04-28 RX ADMIN — PROPOFOL 20 MG: 10 INJECTION, EMULSION INTRAVENOUS at 12:16

## 2025-04-28 RX ADMIN — MELOXICAM 7.5 MG: 7.5 TABLET ORAL at 08:36

## 2025-04-28 RX ADMIN — SODIUM CHLORIDE, POTASSIUM CHLORIDE, SODIUM LACTATE AND CALCIUM CHLORIDE: 600; 310; 30; 20 INJECTION, SOLUTION INTRAVENOUS at 12:09

## 2025-04-28 ASSESSMENT — PAIN SCALES - GENERAL
PAINLEVEL_OUTOF10: 4
PAINLEVEL_OUTOF10: 5 - MODERATE PAIN
PAINLEVEL_OUTOF10: 8
PAINLEVEL_OUTOF10: 6
PAINLEVEL_OUTOF10: 8
PAINLEVEL_OUTOF10: 8
PAINLEVEL_OUTOF10: 0 - NO PAIN
PAINLEVEL_OUTOF10: 5 - MODERATE PAIN
PAINLEVEL_OUTOF10: 7

## 2025-04-28 ASSESSMENT — PAIN - FUNCTIONAL ASSESSMENT
PAIN_FUNCTIONAL_ASSESSMENT: 0-10

## 2025-04-28 ASSESSMENT — ACTIVITIES OF DAILY LIVING (ADL)
LACK_OF_TRANSPORTATION: NO
ADL_ASSISTANCE: INDEPENDENT
LACK_OF_TRANSPORTATION: NO

## 2025-04-28 ASSESSMENT — COGNITIVE AND FUNCTIONAL STATUS - GENERAL
WALKING IN HOSPITAL ROOM: A LITTLE
MOBILITY SCORE: 22
CLIMB 3 TO 5 STEPS WITH RAILING: A LITTLE

## 2025-04-28 ASSESSMENT — PAIN DESCRIPTION - DESCRIPTORS
DESCRIPTORS: DULL
DESCRIPTORS: SORE
DESCRIPTORS: SORE
DESCRIPTORS: ACHING
DESCRIPTORS: ACHING
DESCRIPTORS: SORE
DESCRIPTORS: ACHING;BURNING;PRESSURE
DESCRIPTORS: ACHING

## 2025-04-28 ASSESSMENT — COLUMBIA-SUICIDE SEVERITY RATING SCALE - C-SSRS
1. IN THE PAST MONTH, HAVE YOU WISHED YOU WERE DEAD OR WISHED YOU COULD GO TO SLEEP AND NOT WAKE UP?: NO
6. HAVE YOU EVER DONE ANYTHING, STARTED TO DO ANYTHING, OR PREPARED TO DO ANYTHING TO END YOUR LIFE?: NO
2. HAVE YOU ACTUALLY HAD ANY THOUGHTS OF KILLING YOURSELF?: NO

## 2025-04-28 NOTE — PROGRESS NOTES
Met with Patient and Care Partner at bedside- Patient is s/p Left Posterior Hip Replacement with Dr. Dennis Ingram.  Discussion with patient included education on the following topics: TJR Education: Wound Care (Bandage Care & Removal, Personal Hygiene & Infection Prevention), Post-Op Activity (Home PT Regimen, Movement Precautions, Assistive Equipment & 1-2hr Movement), Post-Op Precautions (Falls, Blood Clots & Constipation), Cold-Therapy (Ice vs. Cold Therapy Machines) , Methods for Symptom Management (Pain, Nausea, Swelling & Constipation), Importance of Post-op Prescriptions, How to Obtain Medication Refills, When to Resume Driving & Who to Call, Use of [employer] & Staff Contact Information, When to call 9-1-1, and When to call the Surgeon's Office.  Patient Did Complete and Live class prior to surgery.  Patient is able to verbalize understanding of class content/post-operative discussion.  Contact information was provided to patient for support and assistance during the post-operative period.    At the time of this discussion, the patient's plan includes:    Discharge Date/Disposition:  Home, Today with, Home Care Services  Discharge Needs: No Equipment Needs Identified  Medications/Pharmacy: [email or web address] service utilized for discharge prescriptions through [facility]

## 2025-04-28 NOTE — DISCHARGE SUMMARY
MD Valentine Bernstein, SLOANES, PAAltheaC, ATC  Adult Reconstruction and Joint Replacement Surgery  Phone: [phone]     Fax:[phone]             Discharge Summary    Discharge Diagnosis  Left Total Hip Arthroplasty    Issues Requiring Follow-Up  Home care services to start within 48 hours. Outpatient PT to start 2 weeks  S/P total Joint for Knees only. Hips optional.    Test Results Pending At Discharge  Pending Labs       No current pending labs.          Hospital Course  Patient underwent Left Total Hip Arthroplasty on 4/28/25 without complications. The patient was then taken to the PACU in stable condition. Patient was then transferred to the or.  Pain was appropriately controlled. Diet was advanced as tolerated. Patient progressed adequately through their recovery during hospital stay including PT/ OT and were recommended for discharge. Patient was then discharged on  to home in stable condition. Patient had uneventful hospital course. Patient was instructed on the use of pain medications as needed for pain. The signs and symptoms of infection were discussed and the patient was given our number to call should they have any questions or concerns following discharge.    Based on my clinical judgment, the patient was provided with a 7-day prescription for opioid medication at 30 MED, indicated for treatment of acute pain in the setting of recent Total Joint Arthroplasty. OARRS report was run and has demonstrated an appropriate time course.  The patient has been provided with counseling pertaining to safe use of opioid medication.    Patient may use operative extremity WBAT with use of walker for assistance with ambulation .  Mepilex dressing to be removed POD # 7 by home care and incision left open to air  OAC for DVT prophylaxis started on POD #1 and to be taken for 30 days    Patient is to follow-up in 6 weeks at scheduled post-op visit.     Patient discharged with prophylactic  antibiotics due to the complex nature of the case.    Face-to Face after surgery progress note  Pertinent Physical Exam At Time of Discharge  Review of Systems   Constitutional: Negative.  Negative for activity change, chills, fatigue and fever.   HENT: Negative.     Eyes: Negative.    Respiratory: Negative.  Negative for cough, chest tightness, shortness of breath and wheezing.    Cardiovascular: Negative.  Negative for palpitations.   Gastrointestinal:  Negative for abdominal pain, blood in stool, nausea and vomiting.   Endocrine: Negative.  Negative for cold intolerance and polyuria.   Genitourinary: Negative.  Negative for difficulty urinating, dysuria, frequency, hematuria and urgency.   Musculoskeletal:  Positive for gait problem and joint swelling. Negative for arthralgias and back pain.   Skin: Negative.  Negative for color change, pallor, rash and wound.   Allergic/Immunologic: Negative.  Negative for environmental allergies.   Neurological:  Negative for dizziness, weakness and light-headedness.   Hematological: Negative.    Psychiatric/Behavioral:  Negative for agitation, confusion and suicidal ideas. The patient is not nervous/anxious.    All other systems reviewed and are negative    Physical Exam  side: left hip  Vitals and nursing note reviewed. VSS, Afebrile  Constitutional:       Appearance: Normal appearance, awake and alert.  HENT:      Head: Normocephalic and atraumatic.       Pupils: Pupils are equal, round, and reactive to light.   Cardiovascular:      Rate and Rhythm: Normal rate and regular rhythm.   Pulmonary:      Effort: Pulmonary effort is normal.     Abdominal:         Palpations: Abdomen is soft.   Musculoskeletal:   Sensation intact bilaterally, sural/saph/sp/tibal n.  Motor intact flexion/extension/DF/PF/EHL/FHL bilaterally. Palpable symmetric DP/PT pulse bilaterally. Spinal wearing off.    Skin:      Bulky Dressing intact to the surgical extremity. No signs of gross bloody or  purulent drainage.     General: Skin is warm and dry.      Capillary Refill: Capillary refill takes less than 2 seconds.   Neurological:      General: No focal deficit present.      Mental Status: She is alert and oriented to person, place, and time. Mental status is at baseline.   Psychiatric:         Mood and Affect: Mood normal.        Home Medications  Scheduled medications  Current Medications[1]     PRN medications  PRN Medications[2]    Discharge medications     Your medication list        START taking these medications        Instructions Last Dose Given Next Dose Due   acetaminophen 500 mg tablet  Commonly known as: Tylenol Extra Strength      Take 2 tablets (1,000 mg) by mouth every 6 hours if needed for mild pain (1 - 3).       aspirin 81 mg EC tablet      Take 1 tablet (81 mg) by mouth 2 times a day.       cephalexin 500 mg capsule  Commonly known as: Keflex      Take 1 capsule (500 mg) by mouth 2 times a day for 10 days.       docusate sodium 100 mg capsule  Commonly known as: Colace      Take 1 capsule (100 mg) by mouth 2 times a day.       meloxicam 15 mg tablet  Commonly known as: Mobic      Take 1 tablet (15 mg) by mouth once daily.       oxyCODONE 5 mg immediate release tablet  Commonly known as: Roxicodone      Take 1 tablet (5 mg) by mouth every 6 hours if needed for severe pain (7 - 10) for up to 7 days.       pantoprazole 40 mg EC tablet  Commonly known as: ProtoNix      Take 1 tablet (40 mg) by mouth once daily. Do not crush, chew, or split.       polyethylene glycol 17 gram/dose powder  Commonly known as: Glycolax, Miralax      Mix 17 g of powder and drink once daily. Mix 1 cap (17g) into 8 ounces of fluid.       traMADol 50 mg tablet  Commonly known as: Ultram      Take 1 tablet (50 mg) by mouth every 6 hours if needed for severe pain (7 - 10) for up to 7 days.              CONTINUE taking these medications        Instructions Last Dose Given Next Dose Due   levothyroxine 100 mcg  tablet  Commonly known as: Synthroid, Levoxyl      Take 1 tablet (100 mcg) by mouth once daily.              STOP taking these medications      cetirizine 10 mg tablet  Commonly known as: ZyrTEC        cholecalciferol 50 mcg (2,000 units) tablet  Commonly known as: Vitamin D-3        fluticasone 50 mcg/actuation nasal spray  Commonly known as: Flonase                  Where to Get Your Medications        These medications were sent to [facility]  [address] , [address], [facility] [address]      Hours: 8 AM to 6 PM Mon-Fri, 9 AM to 1 PM Saturday Phone: [phone]   acetaminophen 500 mg tablet  aspirin 81 mg EC tablet  cephalexin 500 mg capsule  docusate sodium 100 mg capsule  meloxicam 15 mg tablet  oxyCODONE 5 mg immediate release tablet  pantoprazole 40 mg EC tablet  polyethylene glycol 17 gram/dose powder  traMADol 50 mg tablet         You have not been prescribed any medications.     Outpatient Follow-Up  Patient to follow-up with /Valentine Diaz PA-C.  Thank you for trusting us with your care. You should be scheduled for a follow-up post-surgical visit in 6 weeks.    Special Instructions      Please read discharge instructions provided by your surgeon before calling with questions as this will delay care.    Medication refills-Oxycodone and Tramadol will be refilled every 7 days per state law. Request refills through [facility] at the institution in which you had surgery or MyChart. All medication requests may take up to 72 hours to refill and refills after Friday 1pm will be refilled on the next business day.      No future appointments.    DIMITRI Michaud, PA-C ATC  Orthopedic Physician Assisant  Adult Reconstruction and Total Joint Replacement  General Orthopedics  Department of Orthopaedic Surgery  [place] [address]  Haiko messaging preferred         [1]   Current Facility-Administered  Medications:     ropivacaine-epinephrine-clonidine-ketorolac 2.46-0.005- 0.0008-0.3mg/mL periarticular syringe, , , PRN, Dennis Ingram MD, 50 mL at 04/28/25 1143    scopolamine (Transderm-Scop) patch 1 patch, 1 patch, transdermal, q72h, Valentine Diaz PA-C, 1 patch at 04/28/25 0836    Facility-Administered Medications Ordered in Other Encounters:     dexAMETHasone (Decadron) injection, , intravenous, PRN, Jackson Kauffman CAA, 4 mg at 04/28/25 1043    HYDROmorphone (Dilaudid) injection, , intravenous, PRN, Jackson Kauffman CAA, 0.4 mg at 04/28/25 1216    lactated Ringer's infusion, , intravenous, Continuous PRN, Jackson Kauffman CAA, New Bag at 04/28/25 1209    mepivacaine (Carbocaine) 15 mg/mL (1.5 %) injection, , intrathecal, PRN, Jackson Kauffman CAA, 3.6 mL at 04/28/25 1021    midazolam (Versed) injection, , intravenous, PRN, Jackson Kauffman CAA, 2 mg at 04/28/25 1011    ondansetron (Zofran) injection, , intravenous, PRN, Jackson Kauffman CAA, 4 mg at 04/28/25 1151    phenylephrine (Nacho-Synephrine) 10 mg in sodium chloride 0.9% 250 mL (0.04 mg/mL) infusion (premix), , intravenous, Continuous PRN, Jackson Kauffman CAA, Stopped at 04/28/25 1207    phenylephrine in NS (Nacho-Synephrine) 100 mcg/mL syringe, , intravenous, PRN, Jackson Kauffman CAA, 50 mcg at 04/28/25 1124    propofol (Diprivan) injection, , intravenous, PRN, Jackson Kauffman CAA, 20 mg at 04/28/25 1216    tranexamic acid (Cyklokapron) injection, , intravenous, PRN, Jackson Kauffman CAA, 1,000 mg at 04/28/25 1151  [2] PRN medications: ropivacaine-epinephrine-clonidine-ketorolac

## 2025-04-28 NOTE — PROGRESS NOTES
65 yr old female admitted RR following left hip replacement with Dr. Ingram.  Plan is home today with  Home Care PT/OT. SOC confirmed on 4/29/25.  Post op follow up in 6/12/25 at 1:20 pm-Stefany  Family to transport home and assist with care as needed.      04/28/25 1523   Discharge Planning   Living Arrangements Alone   Support Systems Spouse/significant other   Assistance Needed transportation   Type of Residence Private residence   Number of Stairs to Enter Residence 0   Number of Stairs Within Residence 0   Do you have animals or pets at home? No   Who is requesting discharge planning? Provider   Home or Post Acute Services In home services   Type of Home Care Services Home PT;Home OT   Expected Discharge Disposition Home H   Financial Resource Strain   How hard is it for you to pay for the very basics like food, housing, medical care, and heating? Not hard   Housing Stability   In the last 12 months, was there a time when you were not able to pay the mortgage or rent on time? N   In the past 12 months, how many times have you moved where you were living? 0   At any time in the past 12 months, were you homeless or living in a shelter (including now)? N   Transportation Needs   In the past 12 months, has lack of transportation kept you from medical appointments or from getting medications? no   In the past 12 months, has lack of transportation kept you from meetings, work, or from getting things needed for daily living? No   Patient Choice   Provider Choice list and CMS website (https://medicare.gov/care-compare#search) for post-acute Quality and Resource Measure Data were provided and reviewed with: Patient   Patient / Family choosing to utilize agency / facility established prior to hospitalization No   Stroke Family Assessment   Stroke Family Assessment Needed No

## 2025-04-28 NOTE — ANESTHESIA PROCEDURE NOTES
Spinal Block    Patient location during procedure: OR  Start time: 4/28/2025 10:13 AM  End time: 4/28/2025 10:21 AM  Reason for block: primary anesthetic  Staffing  Performed: BRANDON   Authorized by: Komal Cormier MD    Performed by: BRANDON Whitfield    Preanesthetic Checklist  Completed: patient identified, IV checked, risks and benefits discussed, surgical consent, monitors and equipment checked, pre-op evaluation, timeout performed and sterile techniques followed  Block Timeout  RN/Licensed healthcare professional reads aloud to the Anesthesia provider and entire team: Patient identity, procedure with side and site, patient position, and as applicable the availability of implants/special equipment/special requirements.    Timeout performed at: 4/28/2025 10:11 AM  Spinal Block  Patient position: sitting  Prep: Betadine  Sterility prep: cap, drape, gloves, hand hygiene and mask  Sedation level: light sedation  Patient monitoring: blood pressure, continuous pulse oximetry and heart rate  Approach: midline  Vertebral space: L2-3  Injection technique: single-shot  Needle  Needle type: pencil-point   Needle gauge: 24 G  Needle length: 4 in  Free flowing CSF: yes    Assessment  Sensory level: T6 bilateral  Procedure assessment: patient sedated but conversant throughout procedure and patient tolerated procedure well with no immediate complications  Additional Notes  3.6ml 1.5% mepivacaine PF injectedcC  Spinal needle angled to right to obtain CSF.  Needle angled very  slightly rt and pt felt paresthesia on left -- redirected more right and +CSF    Kit exp: 06-  Lot  #[id number]

## 2025-04-28 NOTE — ANESTHESIA POSTPROCEDURE EVALUATION
Patient: Dora Trimble    Procedure Summary       Date: 04/28/25 Room / Location: [facility] / [facility]    Anesthesia Start: 1008 Anesthesia Stop: 1246    Procedure: ARTHROPLASTY, HIP, TOTAL, WITHOUT CEMENT (DePuy [place] Cup, DePuy [place] Stem) ** Rapid Recovery ** (Left: Hip) Diagnosis:       Unilateral primary osteoarthritis, left hip      (Unilateral primary osteoarthritis, left hip [M16.12])    Surgeons: Dennis Ingram MD Responsible Provider: Komal Cormier MD    Anesthesia Type: spinal ASA Status: 3            Anesthesia Type: spinal    Vitals Value Taken Time   /86 04/28/25 12:51   Temp 36 04/28/25 12:51   Pulse 63 04/28/25 12:51   Resp 16 04/28/25 12:51   SpO2 99 04/28/25 12:51       Anesthesia Post Evaluation    Patient location during evaluation: bedside  Patient participation: complete - patient participated  Level of consciousness: awake and alert  Pain management: adequate  Multimodal analgesia pain management approach  Airway patency: patent  Cardiovascular status: acceptable  Respiratory status: acceptable  Hydration status: acceptable  Postoperative Nausea and Vomiting: none        There were no known notable events for this encounter.

## 2025-04-28 NOTE — DISCHARGE INSTRUCTIONS
MD Valentine Bernstein MPAS, JANETH, ATC  Adult Reconstruction and Joint Replacement Surgery  Phone: [phone]     Fax: [phone]        DISCHARGE INSTRUCTIONS      PLEASE READ CAREFULLY BEFORE CONTACTING YOUR PROVIDER.    WE WORK COLLABORATIVELY AS A TEAM. CALLING MULTIPLE STAFF MEMBERS REGARDING THE SAME ISSUE WILL DELAY YOUR CARE.    [employer] IS THE PREFERRED COMMUNICATION FOR ALL TEAM MEMBERS.    POSTOPERATIVE INSTRUCTIONS: TOTAL HIP & TOTAL KNEE ARTHROPLASTY    JOINT CARE TEAM  Please use the information below to contact your care team following surgery.  If you are leaving a message or using the [employer] portal, please include your full name, date of birth and date of surgery so that we can correctly identify you.  Your call will be returned within 1-2 business days, please do not leave multiple messages with other staff regarding a single issue while you are awaiting a return call.     Who to call Contact Information Matters needing handled   Valentine Diaz PA-C  Physician Assistant [employer]   Medical questions/concerns       Joshua Harrell-    [email or web address]           [phone]  opt. 2    [phone] fax  [phone]         Scheduling office Visits  Medical questions/concerns  Leave of Absence or other paperwork  Any concerns more than 6 weeks from surgery - an appointment will need to be made    After Hour and Weekend Emergency Answering Service [phone]     Mireille Escalante MBA, BSN, RN-BC, ONC  CHENG Durand, RN  Ortho Nurse Navigators [place]        __________________________________    Josef Vargas RN, BSN  Ortho Coordinator [facility]fany WEINSTEINN-BC  Ortho Nurse Navigator [facility]       [phone]     _____________________    [phone] Please call staff at the institution in which you had surgery for prescription refills        Prescription Refills  Nursing, medical question related  questions or concerns within 6 weeks of surgery   Orders for Outpatient Physical Therapy             MEDICATION REFILLS - MyChart or Nurse Navigator (Above) at the institution in which you had surgery. [facility] or [facility].    -You will NOT receive a call indicating that your prescription has been filled.  Please contact your pharmacy with any questions.    Medication refills will be filled Monday-Friday 7am to 1pm ONLY. Please call the nurse navigator office or send a [employer] message for a refill request.  Any requests received outside of this timeframe will be handled on the next business day.  Please do not call multiple times or call other members of the care team for medication needs, this will cause the refill to take longer.    Per State and Institutional policy, pain medications can only be refilled every 7 days for up to six weeks following surgery.    My Chart Portal: If you are using the My Chart portal and are requesting a medication refill, please list what type of surgery you had and left or right side, medication that needs refilled, and pharmacy you would like your medication sent.     WEIGHT BEARING- weight bearing as tolerated to operative extremity     ACTIVITY-As Tolerated    DRIVING & TRAVEL AFTER SURGERY   Patients should anticipate waiting at least 4-6 weeks before traveling long distances after surgery.  You will need to stop to walk around ever 1 hour during your travel to help with blood clot prevention.    Patients may not drive until cleared by the joint nurse or the office and you are off of all narcotics.    DENTAL PROCEDURES & CLEANINGS  You must wait a minimum of 3 months for elective dental appointments after a total joint replacement, including routine cleanings or dental work including bridges, crowns, extractions, etc.. Unless, it is an emergency. You will need a prophylactic antibiotic lifelong prior to any dental visit, cleaning or procedure. Your surgeon's office or your  dentist may provide a prescription antibiotic. Antibiotics are a lifelong need before dental appointments.      You do not need antibiotics for endoscopic procedures such as colonoscopy or EGD, dermatologic biopsies or eye surgeries.    WOUND CARE  If you experience continued drainage or bleeding, you may cover with abdominal/Maxi pads (purchase at local drug store).  Knee replacements should wrap with an ace wrap.  You may shower with waterproof dressing on. Your surgical bandage will be removed by your home therapist 1 week after surgery. If you have staples intact, home care will remove in 2 weeks. If you have sutures intact, you will need to return to the office in 2 weeks for suture removal. Once the dressing is removed by home care, you may continue to shower. Let soap and water wash over the wound. DO NOT SCRUB.  Steri-strips under the bandage will remain in place until they fall off on their own.  If they are loose, you may gently remove.  If they have not fallen off in two weeks, gently peel them off. Do not remove if pulling causes resistance against the incision.  You will see suture tails sticking out of the ends of the incision.  DO NOT CUT THEM.  They will fall off when the sutures dissolve.  If they are bothersome, cover with a band aid.  Do not soak in a bath tub, hot tub, pool or lake until you are 8 weeks out from surgery.  Do not apply lotions, creams or ointments until you are 6 weeks out from surgery,    PAIN, SWELLING, BRUISING & CLICKING  Pain and swelling are a natural part of your recovery which is considered normal for up to a year after surgery.  Symptoms may be treated with movement, ice, compression stockings, elevating your leg, and by following the pain medication regimen as prescribed.  Bruising is normal for several weeks after surgery. You may also have leg swelling and pain in your shin.  You may ice areas that are tender to help with discomfort.  You are required to wear the  provided compression stockings, every day, for 4 weeks following surgery.  Remove the stockings at night and place them back on in the morning.  Pain and swelling may temporarily increase with an increase in activity or exercise.  Use ice after activity.  Audible clicking with movement or exercises is considered normal following joint replacement.  If this persists at 6 months or 1 year, please notify your surgeon.  You may also feel decreased sensation or numbness near the incision site.  This is normal and sensation may or may not return.    PERSONAL HYGIENE  You may shower upon discharging from the hospital.  Soap and water is permitted to run over the surgical dressing, steri-strips and incision.  Do not scrub directly over these items.  DO NOT soak your incision in a bath, hot tub, pool or pond/lake for a minimum of 8 weeks following your surgery.  DO NOT use lotions, creams, ointments on your wound for a minimum of 6 weeks following your surgery. At that time you may use vitamin E to assist with softening of your incision.      RESTARTING HOME ROUTINE - DIET & MEDICATIONS  Post-operative constipation can result due to a combination of inactivity, anesthesia and pain medication. To help prevent this, you should increase your water and fiber intake. Physical activity such as walking will also help stimulate the bowels.   You may resume your normal diet when you discharge home.    To avoid constipation, choose foods that help promote good bowel habits, such as foods high in fiber.  You may restart your home medications the following day after your surgery UNLESS you have been given alternate instructions.  Follow the instructions given to you on your hospital discharge instructions for more information regarding your home medications.  IF YOU EXPERIENCE NAUSEA OR DIARRHEA, FOLLOW THE B.R.A.T. DIET UNTIL SYMPTOMS RESOLVE.  If you are experiencing vomiting that lasts more than 24 hours, please contact your joint  nurse.      IN-HOME PHYSICAL THERAPY & OUTPATIENT PHYSICAL THERAPY  In-home physical therapy will start 1-2 days after you get home from the hospital.    The home care agency will call within the first 24-48 hours to set up their first visit.  Please do not call your care team to inquire during this timeframe.  Continue the exercises you were given in the hospital until you have been seen by in-home therapy.  Make sure to provide a phone number with the ability for the home care staff to leave a message if you do not answer your phone.    Outpatient physical therapy following knee replacement surgery should begin 2-3 weeks after surgery.  You will be given physical therapy order prior to discharge from the hospital. You should call to schedule this appointment ASAP if not already scheduled before surgery.  Waiting until you are ready for outpatient physical therapy will cause a delay in your care.  You may choose any outpatient physical therapy location.      EMERGENCIES - WHEN TO CONTACT THE SURGEON'S OFFICE IMMEDIATELY  Fever >101 with chills that has been present for at least 48 hours.   Excessive bleeding from incision that will not slow down. A small amount of drainage is normal and expected.  Once pressure is applied and the area is covered, do not continue to check the area regularly.  This will remove pressure and bleeding will continue.  Leave in place for 4-6 hours.  Signs of infection of incision-excessive drainage that is soaking through your dressing (especially if it is pus-like), redness that is spreading out from the edges of your incision, or increased warmth around the area.  Excruciating pain for which the pain medication, taken as instructed, is not helping.  Severe calf pain.  Go directly to the emergency room or call 911, if you are experiencing chest pain or difficulty breathing.    After Hour and Weekend Emergency Answering Service [phone]    ICE & COLD THERAPY INSTRUCTIONS    To assist  with pain control and post-op swelling, you should be using ice regularly throughout recovery, especially for the first 6 weeks, regardless of the cold therapy method you use.      Always make sure there is a layer of protection between the cold pad and your skin.    If you are using ICE PACKS or GEL PACKS, you will need to alternate 20 minutes on, 20 minutes off twice per hour.    If you are using an ICE MACHINE, please follow the provided ice machine instructions.  These devices differ from ice or ice packs whereas the mechanism circulates water through tubing and a pad to provide longer periods of cold therapy to the desired site.  You can use your cold devices around the clock for optimal comfort.  We recommend using cold therapy after working with therapy or completing exercises on your own.  There is no set schedule in which you must follow while using cold therapy.  Below are a few points to remember when using a cold therapy device:    You do not need to need to use the 20 on, 20 off method.  Detach the pad from the cooler and ambulate at least once every hour.  You can check your skin under the pad at this time.  You may wear the cold therapy device during periods of sleep including overnight.  If you wake up during the night, you can check the skin at this time.  You do not need to wake up specifically to perform skin checks.  Empty the cooler and pad when device is not in use.  Follow 's instructions for cleaning your cold therapy device.    DISCHARGE MEDICATIONS - Please reference the sample schedule on the reverse side for instructions on how to best schedule medications.    PAIN MEDICATION    ___X_ Tramadol / Oxycodone  Tramadol and Oxycodone have been prescribed for post-operative pain control.    These medications will only be refilled ONCE every 7 days for a period of up to 6 weeks following surgery.  After 6 weeks, you will transition to acetaminophen and over -the- counter  anti-inflammatories such as Ibuprofen, Advil or Aleve in conjunction with ICE/COLD THERAPY.   Side effects may be constipation and nausea, vomiting, sleepiness, dizziness, lightheadedness, headache, blurred vision, dry mouth sweating, itching (if you have itching, over-the -counter Benadryl can be used as needed).  You may NOT operate a motor vehicle while taking these medications or have been cleared by your care team.     ___X_ Acetaminophen (Tylenol)  Acetaminophen has been prescribed as an adjunct for pain control. Take two 500 mg tablets every 6 hours for 4 weeks. You will not receive a refill on this medication.  Do not exceed 4000mg of acetaminophen within a 24 hour period.  Side effects may include nausea, heartburn, drowsiness, and headache.    ___X_ Meloxicam (Mobic)-Meloxicam has been prescribed as an adjunct anti-inflammatory to assist in pain control.    Take one 15mg tablet once daily for 4 weeks.  You will not receive refills on this medication.   Side effects may include nausea.  May not be prescribed if you are on a more potent blood thinner than aspirin or have chronic kidney disease.    BLOOD THINNER    ___X_ Blood Thinner   A blood thinner has been prescribed to prevent blood clots in your leg or lungs. Take as prescribed on the bottle for 4 weeks. You will not receive a refill on this medication.    ANTI NAUSEA    ___X_ Proton Pump Inhibitor (PPI)-Stomach Acid Reduction Medication  If you are already on a PPI, you will continue your regular medication. If you are not, you will be prescribed Pantoprazole to help with nausea and protect your stomach while taking pain medication.  You will not receive a refill on this medication.    STOOL SOFTENERS    ___X_ Colace (Docusate Sodium) & Miralax (polyethylene glycol)  Take both medications to help with constipation while using the Oxycodone and Tramadol for pain control.  You will not receive a refill on this medication.    Continued Constipation  If  you continue to be constipated despite daily use of Miralax and Colace, you try an over-the-counter Dulcolax Suppository and use per instructions on the package.       SPECIAL INSTRUCTIONS   Keflex prescribed post-operative due to the complex nature of the case to prevent infection.    You will not receive refills on the following medications.   Acetaminophen (Tylenol  Meloxicam  Miralax  Colace  Proton Pump Inhibitor (PPI)  Blood Thinner    Pain Medication Refills - Ortho Nurse Navigator or MyChart- Monday through Friday 7am-1pm    FOLLOW-UP- You should have an appointment with either Dr. Ingram or KAIDEN Banerjee in 6 weeks.         SAMPLE              The times below are an example of how to organize medications to optimize pain control  Your actual medication schedule may vary based on your last dose taken IN THE HOSPITAL      Time 3:00 am 6:00 am 9:00 am 12:00 pm 3:00 pm 6:00 pm 9:00 pm 12:00 am   Medications Tramadol Tylenol  Oxycodone  Miralax   Blood Thinner  Colace  Pantoprazole or other PPI  Tramadol  Meloxicam Tylenol  Oxycodone Tramadol Tylenol  Oxycodone  Miralax Blood Thinner  Colace  Tramadol   Tylenol  Oxycodone            You may begin to wean off the pain medication as your pain remains controlled with increased activity.  The schedules provided are meant to serve as an example.  You may wean off based on your pain control.  Please note that pain medications are not filled beyond 6 weeks after surgery.              The times below are an example of how to WEAN OFF medications WHILE CONTINUING TO OPTIMIZE PAIN CONTROL.  Your actual medication schedule may vary based on your last dose taken.    Time 12:00am 4:00am 8:00am 12:00pm 4:00pm 8:00pm   Med Tramadol Oxycodone   Tramadol Oxycodone Tramadol Oxycodone     Time 12:00am 6:00am 12:00pm 6:00pm   Med Tramadol Oxycodone   Tramadol Oxycodone     Time 12:00am 8:00am 4:00pm   Med Tramadol Oxycodone   Tramadol     Time 12:00am 12:00pm   Med Tramadol  Tramadol         TOTAL KNEE REPLACEMENT PATIENTS SHOULD TRANSITION TO OUTPATIENT PHYSICAL THERAPY NO MORE THAN 3 WEEKS FOLLOWING SURGERY.  PLEASE SEE THE LIST OF  FACILITIES BELOW.  CALL TO SCHEDULE YOUR FIRST APPOINTMENT BEFORE YOU HAVE YOUR SURGERY.

## 2025-04-28 NOTE — HH CARE COORDINATION
Home Care received a Referral for Physical Therapy and Occupational Therapy. We have processed the referral for a Start of Care on 4/29/25.     If you have any questions or concerns, please feel free to contact us at [phone]. Follow the prompts, enter your five digit zip code, and you will be directed to your care team on [facility].

## 2025-04-28 NOTE — PROGRESS NOTES
Medication Education     Medication education for Dora Trimble was provided to the patient and family for the following medication(s):  Aspirin  Oxycodone  Tramadol  Tylenol  Mobic  Keflex  Protonix  Docusate  Miralax      Medication education provided by a Pharmacist:  Dose, frequency, storage Proper dose, indication, possible ADRs Refilling the medication  How the medication works and benefits of taking it Benefits of taking the medication  Importance of compliance    Identified potential barriers to education:  None    Method(s) of Education:  Verbal Written materials provided and reviewed    An opportunity to ask questions and receive answers was provided.     Assessment of understanding the patient and family:  2= meets goals/outcomes    Additional Notes (if applicable):     M2B delivered.  Pt was slightly drowsy during counseling, family understands instructions.    Surjit Witt, PharmD

## 2025-04-28 NOTE — NURSING NOTE
Nausea improved s/p Zofran. Iv fluids increased.  Side rails up x 2, call bell in reach.  Sister at bedside.  Pushpa Andrade RN

## 2025-04-28 NOTE — PROGRESS NOTES
DIMITRI Michaud, PAAltheaC, ATC  Orthopedic Physician Assisant  Adult Reconstruction and Total Joint Replacement  General Orthopedics  Department of Orthopaedic Surgery  [place] [id number]      DARÍO DinhC

## 2025-04-28 NOTE — OP NOTE
ARTHROPLASTY, HIP, TOTAL, WITHOUT CEMENT (DePuy Landrum Cup, DePuy Towner Stem) ** Rapid Recovery ** (L) Operative Note     Date: 2025  OR Location: [facility]    Name: Dora Trimble, : 1960, Age: 65 y.o., MRN: [id number], Sex: female    Diagnosis  Pre-op Diagnosis      * Unilateral primary osteoarthritis, left hip [M16.12] Post-op Diagnosis     * Unilateral primary osteoarthritis, left hip [M16.12]     Procedures  ARTHROPLASTY, HIP, TOTAL, WITHOUT CEMENT (DePuy Landrum Cup, DePuy Towner Stem) ** Rapid Recovery **  [address]0413 - MA ARTHRP ACETBLR/PROX FEM PROSTC AGRFT/ALGRFT      Surgeons      * Dennis Ingram - Primary    Resident/Fellow/Other Assistant:  Surgeons and Role:     * Valentine Diaz PA-C - [facility] First Assist    Staff:   Circulator: Adrienne Porras Person: Hannah Lovellub Person: Christie    Anesthesia Staff: Anesthesiologist: Komal Cormier MD  C-AA: BRANDON Whitfield    Procedure Summary  Anesthesia: Anesthesia type not filed in the log.  ASA: ASA status not filed in the log.  Estimated Blood Loss: 100mL  Intra-op Medications:   Administrations occurring from 0935 to 1205 on 25:   Medication Name Total Dose   ropivacaine-epinephrine-clonidine-ketorolac 2.46-0.005- 0.0008-0.3mg/mL periarticular syringe 50 mL   dexAMETHasone (Decadron) injection 4 mg/mL 4 mg   LR infusion Cannot be calculated   mepivacaine (Carbocaine) injection 1.5 % 3.6 mL   midazolam (Versed) injection 1 mg/mL 4 mg   ondansetron (Zofran) 2 mg/mL injection 4 mg   phenylephrine (Nacho-Synephrine) 10 mg/250 mL NS (40 mcg/mL) infusion 0.75 mg   phenylephrine 100 mcg/mL syringe 10 mL (prefilled) 250 mcg   propofol (Diprivan) injection 10 mg/mL 642.12 mg   tranexamic acid (Cyklokapron) injection 2,000 mg   ceFAZolin (Ancef) 2 g in dextrose (iso)  mL 2 g              Anesthesia Record               Intraprocedure I/O Totals          Intake    Tranexamic Acid 0.00 mL    The total shown is the total volume documented  since Anesthesia Start was filed.    Phenylephrine Drip 0.00 mL    The total shown is the total volume documented since Anesthesia Start was filed.    Total Intake 0 mL          Drains and/or Catheters: * None in log *      Implants:  Implants       Type Name Action Serial No.      Joint Hip ACETABULAR CUP, SECTOR, GRIPTON, SIZE 54MM - [id number] Implanted      Screw SCREW CANCELLOUS 6.5 X 25 - [id number] Implanted      Screw SCREW CANCELLOUS 6.5 X 30 - [id number] Implanted      Joint Hip LINER, ALTRX, NEURTAL, 36 X 54MM - [id number] Implanted      Joint Hip HIP STEM SUMMIT 5 STD OFFSET - [id number] Implanted       36MM (-2MM), ARTICUL/AIDA FEMORAL CERAMIC HEAD, 12/14 TAPER Implanted               Findings: severe OA    Indications: Dora Trimble is an 65 y.o. female who is having surgery for Unilateral primary osteoarthritis, left hip [M16.12].     The patient was seen in the preoperative area. The risks, benefits, complications, treatment options, non-operative alternatives, expected recovery and outcomes were discussed with the patient. The possibilities of reaction to medication, pulmonary aspiration, injury to surrounding structures, bleeding, recurrent infection, the need for additional procedures, failure to diagnose a condition, and creating a complication requiring transfusion or operation were discussed with the patient. The patient concurred with the proposed plan, giving informed consent.  The site of surgery was properly noted/marked if necessary per policy. The patient has been actively warmed in preoperative area. Preoperative antibiotics have been ordered and given within 1 hours of incision. Venous thrombosis prophylaxis have been ordered including bilateral sequential compression devices    Procedure Details: L GOPAL  Evidence of Infection: No   Complications:  None; patient tolerated the procedure well.    Disposition: PACU - hemodynamically stable.  Condition: stable     PREOPERATIVE  DIAGNOSIS:  left hip osteoarthritis     POSTOPERATIVE DIAGNOSIS:  left hip osteoarthritis     OPERATION/PROCEDURE:  left total hip arthroplasty     SURGEON:  Dennis Ingram MD     ASSISTANT(S):  KAIDEN Banerjee PGY4    The patient's surgery was assisted by KAIDEN Banerjee, due to lack of availability of a qualified resident to assist with the surgery.  Valentine Diaz was present for the essential parts of the procedure.  She acted as first assistant and assisted with preparing the leg, draping the leg, exposing the knee, retracting and manipulating the leg during surgery, facilitating safe performance of the procedure, and closure of the wound.     ANESTHESIA:  Spinal.     ESTIMATED BLOOD LOSS AND INTRAVENOUS FLUIDS:  Please see Anesthesia record.     LOCATION:  [facility]     COMPONENTS USED:  1.  [place] Gription acetabular sector shell 54  2.  Pope femoral stem tapered with Porocoat, size 5 STD  3.  [place] AltrX polyethylene acetabular liner, neutral, 36/54  4.  Biolox Delta ceramic femoral head -2     BRIEF CLINICAL NOTE:  The patient is a 66 yo female with severe radiographic  osteoarthritis of the left hip.  Is morbidly obese with a BMI of 40 and understands that she is at increased risk of wound healing problems, infection and component failure.  They failed conservative treatment  and wished to proceed with total hip arthroplasty, which is indicated  at this time.  We discussed the risks, benefits, alternatives of  surgery including, but not limited to, infection, damage to vessel or  nerve, bleeding, soft tissue pain, DVT, PE, problems with anesthesia,  leg length discrepancy, dislocation, continued soft tissue pain, lack  of range of motion, need for further surgery, etc.  Consent was  obtained.  They were taken to the operating room in order to undergo  the procedure.     OPERATIVE REPORT:  The patient was transferred to the operating room table.  Time-out  was performed confirming patient name, medical  record number,  surgical site, and adequate and appropriate imaging.  The patient  received appropriate IV antibiotics as well as tranexamic acid prior  to the start of the procedure.  It was difficult to position the patient within the [place] frame secondary to her body habitus.  Once we prepped and draped,  posterolateral approach to the hip was performed.  The skin and  subcutaneous tissues were incised sharply.  Hemostasis was obtained  using electrocautery.  We had to go through approximately 4 to 5 inches of adipose tissue before reaching the fascia. The underlying gluteal fascia was identified  and entered using electrocautery followed by [place] scissors.  Charnley  bow was placed.  We had to use the largest blades on the Charnley retractor for exposure.  Because of her extremely limited range of motion and body habitus dissection was very difficult.  The short external rotators and capsule were taken  down in one piece and tagged for later reapproximation making sure to protect the sciatic nerve.  She had extremely limited range of motion.  We had to perform a in situ provisional neck cut and then remove the head separately.  This took an additional 20 minutes.  We then cleaned up the provisional femoral neck to the level of the true neck cut.  The femoral head was removed piecemeal.  They had extensive degenerative changes bipolarly with extensive acetabular osteophytosis.  The acetabulum was exposed.  Subluxing the femur anteriorly was difficult secondary to her body habitus.  We reamed until we had interference fit and placed a Gription shell in appropriate anteversion and inclination.  Two screws were placed to the cup in the pelvis.  We then removed significant peripheral osteophytes using a curved osteotome and a rongeur.  Neutral trial liner was placed.  We turned our attention back to the femur.  It was very difficult to elevate the femur out of the wound secondary to her body habitus.  The femur was  sequentially reamed and broached until we had proximal fit and fill. We then trialed with multiple neck lengths and offsets.  We were only able to reduce the -2 neck length.  They were stable in position of sleep, flexion, internalrotation, did not impinge in external rotation.  We obtained an intraoperative x-ray.  I was happy with theposition of the components and the stability of the hip.  All trial components were removed.  The wound was thoroughly irrigated with Irrisept allowed by normal saline.  The real polyethylene liner was placed.  The stem was then seated to the level of broach and the head was joined with the trunion. The hip was relocated.  The short external rotators and capsule were reapproximated to the trochanter through drill holes  using #5 Ethibond.  The surrounding soft tissue was injected with ARVIND solution the fascia was closed with multiple layers of interrupted 0 Vicryl.  The subcu was closed with interrupted 2-0 Vicryl, and the skin was closed running 3-0 Biosyn followed by Dermabond and Steri-Strips.  Dry  sterile dressing was placed.  The patient was transferred back to the hospital bed without evidence of complication.  They will be weightbearing as tolerated.  They will be on ASA and SCDs for DVT  prophylaxis.    22 modifier: Patient's morbid obesity and body habitus greatly increased the intensity and duration of the case above and beyond that of a total hip arthroplasty.    Task Performed by [facility] Assist or Physician Assistant:   Valentine RICHEY, was necessary to assist on this case due to the nature of the case and difficulty. During the case Valentine Diaz served as my assist by preparing the leg, draping the leg, exposing the knee, retracting and manipulating the leg during surgery, facilitating safe performance of the procedure, and closure of the wound.    Additional Details: WBAT, ASA    Attending Attestation: I was present and scrubbed for the key portions of the  procedure.

## 2025-04-28 NOTE — PERIOPERATIVE NURSING NOTE
Pt doing well in recovery. Pt with equal movement bLE. Ache sore left hip but tolerable to pt. Pt tolerates water. No c/o ponv. Pt ready for phase 2 rapid recovery.

## 2025-04-28 NOTE — NURSING NOTE
1630: Patient received from [facility], patient working with PT.    1650: Patient voided 300ml, 350ml total. Patient passed PT. Patient able to start getting dressed.    1715: Pharmacy gave patient meds. RN discussing discharge instructions with patient and family.    1730: Patient and family verbalize understanding of discharge instructions. No other questions or concerns at this time. Patient dressed with RN assistance. VSS.    1745: IV removed. Patient discharged to family member's car safely via wheelchair.

## 2025-04-28 NOTE — ANESTHESIA PREPROCEDURE EVALUATION
Patient: Dora Trimble    Procedure Information       Anesthesia Start Date/Time: 04/28/25 1008    Procedure: ARTHROPLASTY, HIP, TOTAL, WITHOUT CEMENT (DePuy [place] Cup, DePuy [place] Stem) ** Rapid Recovery ** (Left: Hip) - DePuy [place] Cup, DePuy [place] Stem    Location: [facility] 05 / [facility]    Surgeons: Dennis Ingram MD            Relevant Problems   Cardiac   (+) Chest pain   (+) Hyperlipidemia      GI   (+) Dysphagia      Endocrine   (+) Hypothyroidism   (+) Obesity      Musculoskeletal   (+) Osteoarthritis of hip   (+) Unilateral primary osteoarthritis, left hip      Skin   (+) Dermatitis, eczematoid   (+) Nummular dermatitis   (+) Skin rash       Clinical information reviewed:    Allergies  Meds     OB Status           NPO Detail:  NPO/Void Status  Date of Last Liquid: 04/28/25  Time of Last Liquid: 0600  Date of Last Solid: 04/27/25  Time of Last Solid: 2200  Time of Last Void: 0530         PHYSICAL EXAM    Anesthesia Plan    History of general anesthesia?: yes  History of complications of general anesthesia?: no    ASA 3     spinal

## 2025-04-28 NOTE — PROGRESS NOTES
Physical Therapy Evaluation & Treatment    Patient Name: Dora Trimble  MRN: [id number]  Department: [facility] PACU  Room: [facility] OR  Today's Date: 4/28/2025   Time Calculation  Start Time: 1602  Stop Time: 1642  Time Calculation (min): 40 min    Assessment/Plan   PT Assessment  PT Assessment Results: Decreased strength, Decreased endurance, Impaired balance, Decreased mobility, Orthopedic restrictions, Pain  Rehab Prognosis: Good  Barriers to Discharge Home: Physical needs  Physical Needs: Intermittent mobility assistance needed, Intermittent ADL assistance needed (maintain hip precautions)  Evaluation/Treatment Tolerance: Patient tolerated treatment well  Medical Staff Made Aware: Yes  End of Session Communication: Bedside nurse  Assessment Comment: PT eval low . Pt presenting to [facility] with deficits in functional mobility, impaired balance, and increased pain level. Pt completed bed mobility, transfers, and ambulation with supervision with RW. No buckle or lOB with activity however patient was fatigued limiting her endurance for tasks. Sister will be at home to assist. Pt cued for re direction and attention due to stated fatigue. Pt understanding of hip precautions and maintained them during session. Pt understanding of HEP and completed ther ex during session. PT recommends [facility] with 24 hour supervision/assistance for continued improvement in mobility, strength, and pain management.. Pt clear to DC at rapid recovery patient as long as sister is there to provide strict 24 hour assistance at this time.      End of Session Patient Position: Up in chair   IP OR SWING BED PT PLAN  Inpatient or Swing Bed: Inpatient  PT Plan  Treatment/Interventions: Bed mobility, Transfer training, Gait training, Stair training, Strengthening, Endurance training, Range of motion, Therapeutic activity, Home exercise program, Positioning  PT Plan: Ongoing PT  PT Frequency: BID  PT Discharge Recommendations: Low intensity level of  continued care  Equipment Recommended upon Discharge: Wheeled walker, Straight cane  PT Recommended Transfer Status: Assistive device, Stand by assist  PT - OK to Discharge: Yes      Subjective     General Visit Information:  General  Reason for Referral: L posterior THR  Referred By: Dr. Ingram  Past Medical History Relevant to Rehab: chest pain, OA, HLD, dysphagia, obesity, hypothyroidism, skin rash, thyroidectomy, Nerve block lumbar spine, social alcohol use  Family/Caregiver Present: Yes  Prior to Session Communication: Bedside nurse  Patient Position Received: Bed, 2 rail up  General Comment: cleared by RN and agreeable to session. very fatigued but willing to work  Home Living:  Home Living  Type of Home: Apartment  Lives With: Alone  Home Adaptive Equipment: Walker rolling or standard, Cane  Home Layout: One level  Home Access: Elevator  Home Living Comments: sister to stay and assist - 1 step to enter apartment building and discussed how to navigate steps with patient and family member  Prior Level of Function:  Prior Function Per Pt/Caregiver Report  Level of [place]: Independent with ADLs and functional transfers  Receives Help From: Family  ADL Assistance: Independent  Homemaking Assistance: Independent  Ambulatory Assistance: Independent  Vocational: Retired  Prior Function Comments: nof alls within last 6 months  Precautions:  Precautions  LE Weight Bearing Status: Weight Bearing as Tolerated  Medical Precautions: Fall precautions  Post-Surgical Precautions: Left hip precautions  Precautions Comment: posterior hip precautions           Objective   Pain:  Pain Assessment  Pain Assessment: 0-10  0-10 (Numeric) Pain Score: 4  Pain Type: Surgical pain  Pain Location: Hip  Pain Orientation: Left  Pain Descriptors: Aching  Pain Frequency: Intermittent  Pain Interventions: Rest, Repositioned, Ambulation/increased activity, Emotional support  Response to Interventions:  Sleeping  Cognition:  Cognition  Overall Cognitive Status: Within Functional Limits  Attention: Exceptions to WFL  Other (Comment): limited attention and requiring re direction to task due to fatigue  Memory: Within Funtional Limits  Problem Solving: Within Functional Limits  Numeric Reasoning: Within Functional Limits  Abstract Reasoning: Within Functional Limits  Safety/Judgement: Within Functional Limits  Insight: Within function limits  Impulsive: Within functional limits  Processing Speed: Delayed (fatigued)    General Assessments:  General Observation  General Observation: dressing dry and intact on L hip     Activity Tolerance  Endurance: Tolerates 30 min exercise with multiple rests    Sensation  Light Touch: No apparent deficits    Perception  Inattention/Neglect: Appears intact  Initiation: Appears intact  Motor Planning: Appears intact  Perseveration: Not present    Coordination  Movements are Fluid and Coordinated: Yes    Postural Control  Posture Comment: forward flexed posture when standing    Static Sitting Balance  Static Sitting-Balance Support: Feet supported  Static Sitting-Level of Assistance: Distant supervision  Dynamic Sitting Balance  Dynamic Sitting-Balance Support: Feet supported  Dynamic Sitting-Level of Assistance: Distant supervision    Static Standing Balance  Static Standing-Balance Support: Bilateral upper extremity supported  Static Standing-Level of Assistance: Close supervision  Static Standing-Comment/Number of Minutes: with RW  Dynamic Standing Balance  Dynamic Standing-Balance Support: Bilateral upper extremity supported  Dynamic Standing-Level of Assistance: Close supervision  Dynamic Standing-Comments: with RW  Functional Assessments:  Bed Mobility  Bed Mobility: Yes  Bed Mobility 1  Bed Mobility 1: Supine to sitting, Scooting  Level of Assistance 1: Close supervision  Bed Mobility Comments 1: cues to move to EOB without hands on assist    Transfers  Transfer: Yes  Transfer  1  Technique 1: Stand to sit, Sit to stand  Transfer Device 1: Walker  Transfer Level of Assistance 1: Close supervision  Trials/Comments 1: from bed x1 and from commode x1 without buckle ro LOB, cueing for hand placement,    Ambulation/Gait Training  Ambulation/Gait Training Performed: Yes  Ambulation/Gait Training 1  Surface 1: Level tile  Device 1: Rolling walker  Assistance 1: Close supervision  Quality of Gait 1: Decreased step length, Forward flexed posture, Antalgic  Comments/Distance (ft) 1: reciprocal stepping initiated after initial walking step to pattern, slower omari, no buckle or lOB, 30 feetx1, 100 feetx1    Stairs  Stairs: No (educated on how to navigate threshold step with walker moving first for ascending or descending, then ascending with RLE up and descending with LLE down. patient and sister understanding of activity.)  Extremity/Trunk Assessments:  RUE   RUE : Within Functional Limits  LUE   LUE: Within Functional Limits  RLE   RLE : Within Functional Limits  LLE   LLE : Exceptions to WFL  Strength LLE  LLE Overall Strength: Greater than or equal to 3/5 as evidenced by functional mobility, Within Functional Limits - able to perform ADL tasks with strength  Treatments:  Therapeutic Exercise  Therapeutic Exercise Performed: Yes (LLE only)  Therapeutic Exercise Activity 1: ankle pumpx7  Therapeutic Exercise Activity 2: quadset x7  Therapeutic Exercise Activity 3: glute set x7  Therapeutic Exercise Activity 4: heel slide x7 with some assist  Therapeutic Exercise Activity 5: hip abduction x7 with some assist  Therapeutic Exercise Activity 6: SAQ x7    Therapeutic Activity  Therapeutic Activity Performed: Yes  Therapeutic Activity 1: pt toileted without assist for hygiene or hand hygiene post void. no buckle or lOB with activity but pt reporting feeling tired.  Outcome Measures:  [facility] Basic Mobility  Turning from your back to your side while in a flat bed without using bedrails: None  Moving from  lying on your back to sitting on the side of a flat bed without using bedrails: None  Moving to and from bed to chair (including a wheelchair): None  Standing up from a chair using your arms (e.g. wheelchair or bedside chair): None  To walk in hospital room: A little  Climbing 3-5 steps with railing: A little  Basic Mobility - Total Score: 22        Education Documentation  Handouts, taught by Olga Yates PT at 4/28/2025  5:05 PM.  Learner: Family, Patient  Readiness: Acceptance  Method: Explanation, Demonstration, Handout  Response: Verbalizes Understanding, Demonstrated Understanding    Precautions, taught by Olga Yates PT at 4/28/2025  5:05 PM.  Learner: Family, Patient  Readiness: Acceptance  Method: Explanation, Demonstration, Handout  Response: Verbalizes Understanding, Demonstrated Understanding    Body Mechanics, taught by Olga Yates PT at 4/28/2025  5:05 PM.  Learner: Family, Patient  Readiness: Acceptance  Method: Explanation, Demonstration, Handout  Response: Verbalizes Understanding, Demonstrated Understanding    Home Exercise Program, taught by Olga Yates PT at 4/28/2025  5:05 PM.  Learner: Family, Patient  Readiness: Acceptance  Method: Explanation, Demonstration, Handout  Response: Verbalizes Understanding, Demonstrated Understanding    Mobility Training, taught by Olga Yates PT at 4/28/2025  5:05 PM.  Learner: Family, Patient  Readiness: Acceptance  Method: Explanation, Demonstration, Handout  Response: Verbalizes Understanding, Demonstrated Understanding    Education Comments  Pt educated on hip precautions including: Do not bend past 90 degree angle, do not cross the legs ( hip adduction ), no hip internal rotation. Pt educated on ambulating each hour while awake with use of walker. Educated to use reacher, shoe horn, sock aide, for assistance with ADLs and tasks to prevent dislocation.     Olga Yates, PT, DPT

## 2025-04-29 ENCOUNTER — HOME CARE VISIT (OUTPATIENT)
Dept: HOME HEALTH SERVICES | Facility: HOME HEALTH | Age: 65
End: 2025-04-29
Payer: MEDICARE

## 2025-04-29 PROCEDURE — G0151 HHCP-SERV OF PT,EA 15 MIN: HCPCS | Mod: HHH

## 2025-04-29 PROCEDURE — 169592 NO-PAY CLAIM PROCEDURE

## 2025-04-29 SDOH — HEALTH STABILITY: PHYSICAL HEALTH: EXERCISE ACTIVITIES SETS: 1

## 2025-04-29 SDOH — HEALTH STABILITY: PHYSICAL HEALTH: EXERCISE COMMENTS: D MARCHING

## 2025-04-29 SDOH — ECONOMIC STABILITY: HOUSING INSECURITY
HOME SAFETY: THERAPIST DISCUSSED WITH PATIENT AND CAREGIVER IMPORTANCE OF HOME SAFETY, SPECIFICALLY FOR CLUTTER FREE WALKING PATHWAYS AND ADEQUATE LIGHTING

## 2025-04-29 SDOH — ECONOMIC STABILITY: HOUSING INSECURITY: OPEN FLAME PRESENT: 1

## 2025-04-29 SDOH — HEALTH STABILITY: PHYSICAL HEALTH: PHYSICAL EXERCISE: SUPINE, SITTING, STANDING

## 2025-04-29 SDOH — HEALTH STABILITY: PHYSICAL HEALTH: EXERCISE TYPE: THA PROTOCOL

## 2025-04-29 SDOH — HEALTH STABILITY: PHYSICAL HEALTH: PHYSICAL EXERCISE: 10

## 2025-04-29 SDOH — HEALTH STABILITY: PHYSICAL HEALTH
EXERCISE COMMENTS: THA PROTOCOL INCLUDES THE FOLLOWING:  SUPINE- ANKLE PUMPS, QUAD SETS, GLUTEAL SETS, HIP FLEXION, HIP ABDUCTION, AND SAQS  SEATED- LAQS, ANKLE PUMPS, AND HIP ABDUCTION  STANDING- CALF RAISES, KNEE FLEXION, HIP FLEXION, HIP ABDUCTION, HIP EXTENSION, AN

## 2025-04-29 SDOH — HEALTH STABILITY: PHYSICAL HEALTH: EXERCISE ACTIVITY: THA PROTOCOL

## 2025-04-29 ASSESSMENT — ENCOUNTER SYMPTOMS
LOWEST PAIN SEVERITY IN PAST 24 HOURS: 4/10
PAIN LOCATION - EXACERBATING FACTORS: PROLONGED STANDING, ROM
MUSCLE WEAKNESS: 1
PAIN: 1
ARTHRALGIAS: 1
DEPRESSION: 0
PAIN SEVERITY GOAL: 2/10
PAIN LOCATION - PAIN FREQUENCY: FREQUENT
PAIN LOCATION - RELIEVING FACTORS: ICE, MEDS
LOSS OF SENSATION IN FEET: 1
PAIN LOCATION: LEFT HIP
LIMITED RANGE OF MOTION: 1
PERSON REPORTING PAIN: PATIENT
PAIN LOCATION - PAIN SEVERITY: 6/10
HIGHEST PAIN SEVERITY IN PAST 24 HOURS: 7/10
SUBJECTIVE PAIN PROGRESSION: WAXING AND WANING
OCCASIONAL FEELINGS OF UNSTEADINESS: 1

## 2025-04-29 ASSESSMENT — ACTIVITIES OF DAILY LIVING (ADL)
AMBULATION ASSISTANCE ON FLAT SURFACES: 1
AMBULATION ASSISTANCE: 1
OASIS_M1830: 05
AMBULATION ASSISTANCE: ONE PERSON
CURRENT_FUNCTION: INDEPENDENT
ENTERING_EXITING_HOME: SUPERVISION
PHYSICAL TRANSFERS ASSESSED: 1
AMBULATION ASSISTANCE: STAND BY ASSIST

## 2025-05-02 ENCOUNTER — HOME CARE VISIT (OUTPATIENT)
Dept: HOME HEALTH SERVICES | Facility: HOME HEALTH | Age: 65
End: 2025-05-02
Payer: MEDICARE

## 2025-05-02 PROCEDURE — G0151 HHCP-SERV OF PT,EA 15 MIN: HCPCS | Mod: HHH

## 2025-05-02 SDOH — HEALTH STABILITY: PHYSICAL HEALTH: PHYSICAL EXERCISE: 15

## 2025-05-02 SDOH — HEALTH STABILITY: PHYSICAL HEALTH: EXERCISE COMMENTS: D MARCHING

## 2025-05-02 SDOH — ECONOMIC STABILITY: HOUSING INSECURITY: OPEN FLAME PRESENT: 1

## 2025-05-02 SDOH — HEALTH STABILITY: PHYSICAL HEALTH: PHYSICAL EXERCISE: SUPINE, SITTING, STANDING

## 2025-05-02 SDOH — HEALTH STABILITY: PHYSICAL HEALTH: EXERCISE TYPE: THA PROTOCOL

## 2025-05-02 SDOH — HEALTH STABILITY: PHYSICAL HEALTH: EXERCISE ACTIVITIES SETS: 2

## 2025-05-02 SDOH — HEALTH STABILITY: PHYSICAL HEALTH: EXERCISE ACTIVITY: THA PROTOCOL

## 2025-05-02 ASSESSMENT — ENCOUNTER SYMPTOMS
PAIN LOCATION - PAIN FREQUENCY: FREQUENT
PAIN: PATIENT DILIGENT WITH PAIN CONTROL TECHNIQUES
PAIN: 1
PAIN LOCATION - EXACERBATING FACTORS: PROLONGED STANDING
LOWEST PAIN SEVERITY IN PAST 24 HOURS: 4/10
SUBJECTIVE PAIN PROGRESSION: GRADUALLY IMPROVING
PAIN LOCATION: LEFT HIP
LOSS OF SENSATION IN FEET: 0
PAIN SEVERITY GOAL: 3/10
ARTHRALGIAS: 1
LIMITED RANGE OF MOTION: 1
OCCASIONAL FEELINGS OF UNSTEADINESS: 1
DEPRESSION: 0
PERSON REPORTING PAIN: PATIENT
MUSCLE WEAKNESS: 1
PAIN LOCATION - RELIEVING FACTORS: ICE, MEDS
HIGHEST PAIN SEVERITY IN PAST 24 HOURS: 6/10
PAIN LOCATION - PAIN SEVERITY: 4/10

## 2025-05-02 ASSESSMENT — ACTIVITIES OF DAILY LIVING (ADL)
AMBULATION ASSISTANCE: STAND BY ASSIST
AMBULATION ASSISTANCE: ONE PERSON
AMBULATION ASSISTANCE: 1
PHYSICAL TRANSFERS ASSESSED: 1
CURRENT_FUNCTION: INDEPENDENT
AMBULATION ASSISTANCE ON FLAT SURFACES: 1

## 2025-05-03 ENCOUNTER — HOME CARE VISIT (OUTPATIENT)
Dept: HOME HEALTH SERVICES | Facility: HOME HEALTH | Age: 65
End: 2025-05-03
Payer: MEDICARE

## 2025-05-03 PROCEDURE — G0152 HHCP-SERV OF OT,EA 15 MIN: HCPCS | Mod: HHH

## 2025-05-05 ENCOUNTER — HOME CARE VISIT (OUTPATIENT)
Dept: HOME HEALTH SERVICES | Facility: HOME HEALTH | Age: 65
End: 2025-05-05
Payer: MEDICARE

## 2025-05-05 VITALS
OXYGEN SATURATION: 98 % | HEART RATE: 66 BPM | SYSTOLIC BLOOD PRESSURE: 128 MMHG | DIASTOLIC BLOOD PRESSURE: 78 MMHG | RESPIRATION RATE: 16 BRPM

## 2025-05-05 DIAGNOSIS — M16.12 UNILATERAL PRIMARY OSTEOARTHRITIS, LEFT HIP: ICD-10-CM

## 2025-05-05 PROCEDURE — G0151 HHCP-SERV OF PT,EA 15 MIN: HCPCS | Mod: HHH

## 2025-05-05 RX ORDER — TRAMADOL HYDROCHLORIDE 50 MG/1
50 TABLET ORAL EVERY 6 HOURS PRN
Qty: 28 TABLET | Refills: 0 | Status: SHIPPED | OUTPATIENT
Start: 2025-05-05 | End: 2025-05-12

## 2025-05-05 RX ORDER — OXYCODONE HYDROCHLORIDE 5 MG/1
5 TABLET ORAL EVERY 6 HOURS PRN
Qty: 28 TABLET | Refills: 0 | Status: SHIPPED | OUTPATIENT
Start: 2025-05-05 | End: 2025-05-12

## 2025-05-05 SDOH — HEALTH STABILITY: PHYSICAL HEALTH: PHYSICAL EXERCISE: 15

## 2025-05-05 SDOH — HEALTH STABILITY: PHYSICAL HEALTH: EXERCISE TYPE: THA PROTOCOL

## 2025-05-05 SDOH — HEALTH STABILITY: PHYSICAL HEALTH
EXERCISE COMMENTS: THA PROTOCOL INCLUDES THE FOLLOWING:  SUPINE- ANKLE PUMPS, QUAD SETS, GLUTEAL SETS, HIP FLEXION, HIP ABDUCTION, AND SAQS, SLR  SEATED- LAQS, ANKLE PUMPS, AND HIP ABDUCTION  STANDING- CALF RAISES, KNEE FLEXION, HIP FLEXION, HIP ABDUCTION, HIP EXTENSION,

## 2025-05-05 SDOH — HEALTH STABILITY: PHYSICAL HEALTH: EXERCISE ACTIVITY: THA PROTOCOL

## 2025-05-05 SDOH — ECONOMIC STABILITY: HOUSING INSECURITY: ELEVATOR PRESENT: 1

## 2025-05-05 SDOH — HEALTH STABILITY: PHYSICAL HEALTH: EXERCISE ACTIVITIES SETS: 2

## 2025-05-05 SDOH — HEALTH STABILITY: PHYSICAL HEALTH: EXERCISE COMMENTS: MINI SQUATS, AND MARCHING

## 2025-05-05 SDOH — HEALTH STABILITY: PHYSICAL HEALTH: PHYSICAL EXERCISE: SUPINE, SITTING, STANDING

## 2025-05-05 ASSESSMENT — ENCOUNTER SYMPTOMS
ARTHRALGIAS: 1
PAIN LOCATION - EXACERBATING FACTORS: LOADING
PAIN LOCATION - PAIN DURATION: 2 HOURS
PAIN: 1
MUSCLE WEAKNESS: 1
PERSON REPORTING PAIN: PATIENT
PAIN LOCATION: LEFT HIP
LIMITED RANGE OF MOTION: 1
PAIN LOCATION - RELIEVING FACTORS: UNLOADING
HIGHEST PAIN SEVERITY IN PAST 24 HOURS: 3/10
PAIN LOCATION - PAIN QUALITY: SORENESS
PAIN LOCATION - PAIN FREQUENCY: FREQUENT
PAIN LOCATION - RELIEVING FACTORS: UNLOADING
PERSON REPORTING PAIN: PATIENT
PAIN LOCATION - EXACERBATING FACTORS: LOADING
PAIN LOCATION - PAIN SEVERITY: 3/10
LOWEST PAIN SEVERITY IN PAST 24 HOURS: 2/10
PAIN LOCATION: LEFT HIP
PAIN: 1
SUBJECTIVE PAIN PROGRESSION: GRADUALLY IMPROVING
HIGHEST PAIN SEVERITY IN PAST 24 HOURS: 3/10
AGGRESSION WITHIN DEFINED LIMITS: 1
ANGER WITHIN DEFINED LIMITS: 1
PAIN SEVERITY GOAL: 0/10
OCCASIONAL FEELINGS OF UNSTEADINESS: 1
LOWEST PAIN SEVERITY IN PAST 24 HOURS: 2/10
PAIN LOCATION - PAIN SEVERITY: 4/10
PAIN LOCATION - PAIN QUALITY: SORENESS
PAIN SEVERITY GOAL: 0/10
PAIN LOCATION - PAIN DURATION: 2 HOURS
SUBJECTIVE PAIN PROGRESSION: GRADUALLY IMPROVING
PAIN LOCATION - PAIN FREQUENCY: FREQUENT

## 2025-05-05 ASSESSMENT — ACTIVITIES OF DAILY LIVING (ADL)
BATHING_CURRENT_FUNCTION: SUPERVISION
PHYSICAL TRANSFERS ASSESSED: 1
SPONGING_UB_CURRENT_FUNCTION: SUPERVISION
GROOMING_CURRENT_FUNCTION: INDEPENDENT
DRESSING_LB_CURRENT_FUNCTION: SUPERVISION
GROOMING ASSESSED: 1
GROOMING_CURRENT_FUNCTION: INDEPENDENT
FEEDING_WITHIN_DEFINED_LIMITS: 1
GROOMING ASSESSED: 1
DRESSING_LB_CURRENT_FUNCTION: SUPERVISION
CURRENT_FUNCTION: SUPERVISION
AMBULATION ASSISTANCE: ONE PERSON
SPONGING_LB_CURRENT_FUNCTION: SUPERVISION
AMBULATION ASSISTANCE: SUPERVISION
ORAL_CARE_CURRENT_FUNCTION_INDEPENDENT: 1
AMBULATION ASSISTANCE: 1
BATHING_CURRENT_FUNCTION: SUPERVISION
AMBULATION ASSISTANCE ON FLAT SURFACES: 1
AMBULATION ASSISTANCE: 1
BATHING ASSESSED: 1
BATHING ASSESSED: 1
PREPARING MEALS: NEEDS ASSISTANCE
CURRENT_FUNCTION: INDEPENDENT
AMBULATION ASSISTANCE: STAND BY ASSIST
PHYSICAL TRANSFERS ASSESSED: 1
PREPARING MEALS: NEEDS ASSISTANCE

## 2025-05-05 ASSESSMENT — PAIN SCALES - PAIN ASSESSMENT IN ADVANCED DEMENTIA (PAINAD)
CONSOLABILITY: 0
BODYLANGUAGE: 0
CONSOLABILITY: 0 - NO NEED TO CONSOLE.
NEGVOCALIZATION: 0
BREATHING: 0
TOTALSCORE: 0
BODYLANGUAGE: 0 - RELAXED.
FACIALEXPRESSION: 0
NEGVOCALIZATION: 0 - NONE.
FACIALEXPRESSION: 0 - SMILING OR INEXPRESSIVE.

## 2025-05-09 ENCOUNTER — HOME CARE VISIT (OUTPATIENT)
Dept: HOME HEALTH SERVICES | Facility: HOME HEALTH | Age: 65
End: 2025-05-09
Payer: MEDICARE

## 2025-05-09 PROCEDURE — G0151 HHCP-SERV OF PT,EA 15 MIN: HCPCS | Mod: HHH

## 2025-05-09 SDOH — HEALTH STABILITY: PHYSICAL HEALTH: EXERCISE TYPE: THA PROTOCOL

## 2025-05-09 SDOH — HEALTH STABILITY: PHYSICAL HEALTH: PHYSICAL EXERCISE: 15

## 2025-05-09 SDOH — HEALTH STABILITY: PHYSICAL HEALTH: EXERCISE COMMENTS: D MARCHING

## 2025-05-09 SDOH — HEALTH STABILITY: PHYSICAL HEALTH: EXERCISE ACTIVITY: THA PROTOCOL

## 2025-05-09 SDOH — HEALTH STABILITY: PHYSICAL HEALTH: PHYSICAL EXERCISE: SUPINE, SITTING, STANDING

## 2025-05-09 SDOH — HEALTH STABILITY: PHYSICAL HEALTH: EXERCISE ACTIVITIES SETS: 2

## 2025-05-09 SDOH — ECONOMIC STABILITY: HOUSING INSECURITY: OPEN FLAME PRESENT: 1

## 2025-05-09 ASSESSMENT — ACTIVITIES OF DAILY LIVING (ADL)
AMBULATION ASSISTANCE ON FLAT SURFACES: 1
PHYSICAL TRANSFERS ASSESSED: 1
CURRENT_FUNCTION: INDEPENDENT
AMBULATION ASSISTANCE: 1
AMBULATION ASSISTANCE: ONE PERSON
AMBULATION ASSISTANCE: STAND BY ASSIST

## 2025-05-09 ASSESSMENT — ENCOUNTER SYMPTOMS
PAIN: PATIENT DILIGENT WITH PAIN CONTROL TECHNIQUES
SUBJECTIVE PAIN PROGRESSION: GRADUALLY IMPROVING
PERSON REPORTING PAIN: PATIENT
LOWEST PAIN SEVERITY IN PAST 24 HOURS: 4/10
MUSCLE WEAKNESS: 1
LOSS OF SENSATION IN FEET: 0
HIGHEST PAIN SEVERITY IN PAST 24 HOURS: 6/10
PAIN LOCATION: LEFT HIP
ARTHRALGIAS: 1
PAIN LOCATION - PAIN SEVERITY: 5/10
PAIN LOCATION - EXACERBATING FACTORS: PROLONGED STANDING, ROM
PAIN LOCATION - PAIN FREQUENCY: FREQUENT
OCCASIONAL FEELINGS OF UNSTEADINESS: 1
PAIN SEVERITY GOAL: 3/10
PAIN LOCATION - RELIEVING FACTORS: ICE, MEDS
PAIN: 1
LIMITED RANGE OF MOTION: 1
DEPRESSION: 0

## 2025-05-13 ENCOUNTER — HOME CARE VISIT (OUTPATIENT)
Dept: HOME HEALTH SERVICES | Facility: HOME HEALTH | Age: 65
End: 2025-05-13
Payer: MEDICARE

## 2025-05-13 DIAGNOSIS — Z96.652 S/P TKR (TOTAL KNEE REPLACEMENT) USING CEMENT, LEFT: Primary | ICD-10-CM

## 2025-05-13 PROCEDURE — G0151 HHCP-SERV OF PT,EA 15 MIN: HCPCS | Mod: HHH

## 2025-05-13 RX ORDER — TRAMADOL HYDROCHLORIDE 50 MG/1
50 TABLET ORAL EVERY 6 HOURS PRN
Qty: 28 TABLET | Refills: 0 | Status: SHIPPED | OUTPATIENT
Start: 2025-05-13 | End: 2025-05-20

## 2025-05-13 RX ORDER — OXYCODONE HYDROCHLORIDE 5 MG/1
5 TABLET ORAL EVERY 6 HOURS PRN
Qty: 28 TABLET | Refills: 0 | Status: SHIPPED | OUTPATIENT
Start: 2025-05-13 | End: 2025-05-20

## 2025-05-13 SDOH — HEALTH STABILITY: PHYSICAL HEALTH: PHYSICAL EXERCISE: 15

## 2025-05-13 SDOH — HEALTH STABILITY: PHYSICAL HEALTH: EXERCISE ACTIVITIES SETS: 2

## 2025-05-13 SDOH — ECONOMIC STABILITY: HOUSING INSECURITY
HOME SAFETY: THERAPIST DISCUSSED WITH PATIENT AND CAREGIVER IMPORTANCE OF HOME SAFETY, SPECIFICALLY FOR CLUTTER FREE WALKING PATHWAYS AND ADEUQATE LIGHTING

## 2025-05-13 SDOH — HEALTH STABILITY: PHYSICAL HEALTH: PHYSICAL EXERCISE: SUPINE, SITTING, STANDING

## 2025-05-13 SDOH — HEALTH STABILITY: PHYSICAL HEALTH: EXERCISE ACTIVITY: THA PROTOCOL

## 2025-05-13 SDOH — HEALTH STABILITY: PHYSICAL HEALTH: EXERCISE COMMENTS: D MARCHING

## 2025-05-13 SDOH — HEALTH STABILITY: PHYSICAL HEALTH: EXERCISE TYPE: THA PROTOCOL

## 2025-05-13 ASSESSMENT — ENCOUNTER SYMPTOMS
OCCASIONAL FEELINGS OF UNSTEADINESS: 1
PAIN LOCATION - EXACERBATING FACTORS: PROLONGED STANDING, ROM
SUBJECTIVE PAIN PROGRESSION: GRADUALLY IMPROVING
PAIN: 1
PAIN SEVERITY GOAL: 2/10
LIMITED RANGE OF MOTION: 1
PAIN LOCATION - RELIEVING FACTORS: ICE, MEDS
HIGHEST PAIN SEVERITY IN PAST 24 HOURS: 5/10
DEPRESSION: 0
LOSS OF SENSATION IN FEET: 0
PAIN LOCATION: LEFT HIP
MUSCLE WEAKNESS: 1
PAIN LOCATION - PAIN FREQUENCY: FREQUENT
LOWEST PAIN SEVERITY IN PAST 24 HOURS: 3/10
PAIN LOCATION - PAIN SEVERITY: 3/10
PERSON REPORTING PAIN: PATIENT

## 2025-05-13 ASSESSMENT — ACTIVITIES OF DAILY LIVING (ADL)
AMBULATION ASSISTANCE ON FLAT SURFACES: 1
AMBULATION_DISTANCE/DURATION_TOLERATED: 150 FT
AMBULATION ASSISTANCE: 1
AMBULATION ASSISTANCE: STAND BY ASSIST

## 2025-05-16 ENCOUNTER — HOME CARE VISIT (OUTPATIENT)
Dept: HOME HEALTH SERVICES | Facility: HOME HEALTH | Age: 65
End: 2025-05-16
Payer: MEDICARE

## 2025-05-16 PROCEDURE — G0151 HHCP-SERV OF PT,EA 15 MIN: HCPCS | Mod: HHH

## 2025-05-16 SDOH — HEALTH STABILITY: PHYSICAL HEALTH: PHYSICAL EXERCISE: 15

## 2025-05-16 SDOH — HEALTH STABILITY: PHYSICAL HEALTH: EXERCISE ACTIVITIES SETS: 2

## 2025-05-16 SDOH — HEALTH STABILITY: PHYSICAL HEALTH: EXERCISE ACTIVITY: THA PROTOCOL

## 2025-05-16 SDOH — HEALTH STABILITY: PHYSICAL HEALTH: EXERCISE TYPE: THA PROTOCOL

## 2025-05-16 SDOH — HEALTH STABILITY: PHYSICAL HEALTH
EXERCISE COMMENTS: THA PROTOCOL INCLUDES THE FOLLOWING:  SUPINE- ANKLE PUMPS, QUAD SETS, GLUTEAL SETS, BRIDGING, HIP FLEXION, HIP ABDUCTION, AND SAQS  SEATED- LAQS, ANKLE PUMPS, AND HIP ABDUCTION  STANDING- CALF RAISES, KNEE FLEXION, HIP FLEXION, HIP ABDUCTION, HIP EXTENS

## 2025-05-16 SDOH — HEALTH STABILITY: PHYSICAL HEALTH: EXERCISE COMMENTS: ION, AND MARCHING.   ISOMETRIC HIP ABDUCTION AGAINST WALL

## 2025-05-16 SDOH — HEALTH STABILITY: PHYSICAL HEALTH: PHYSICAL EXERCISE: SUPINE, SITTING, STANDING

## 2025-05-16 ASSESSMENT — ENCOUNTER SYMPTOMS
PAIN LOCATION - RELIEVING FACTORS: ICE, MEDS
SUBJECTIVE PAIN PROGRESSION: GRADUALLY IMPROVING
PAIN LOCATION - PAIN FREQUENCY: FREQUENT
LIMITED RANGE OF MOTION: 1
OCCASIONAL FEELINGS OF UNSTEADINESS: 1
PERSON REPORTING PAIN: PATIENT
MUSCLE WEAKNESS: 1
PAIN: 1
PAIN LOCATION - EXACERBATING FACTORS: PROLONGED STANDING, ROM
PAIN LOCATION: LEFT HIP
LOWEST PAIN SEVERITY IN PAST 24 HOURS: 3/10
PAIN SEVERITY GOAL: 2/10
HIGHEST PAIN SEVERITY IN PAST 24 HOURS: 5/10
PAIN LOCATION - PAIN SEVERITY: 3/10
LOSS OF SENSATION IN FEET: 0
DEPRESSION: 0

## 2025-05-16 ASSESSMENT — ACTIVITIES OF DAILY LIVING (ADL)
AMBULATION ASSISTANCE: 1
AMBULATION ASSISTANCE: STAND BY ASSIST
AMBULATION ASSISTANCE ON FLAT SURFACES: 1
AMBULATION_DISTANCE/DURATION_TOLERATED: 225 FEET

## 2025-05-19 ENCOUNTER — HOME CARE VISIT (OUTPATIENT)
Dept: HOME HEALTH SERVICES | Facility: HOME HEALTH | Age: 65
End: 2025-05-19
Payer: MEDICARE

## 2025-05-19 PROCEDURE — G0151 HHCP-SERV OF PT,EA 15 MIN: HCPCS | Mod: HHH

## 2025-05-19 SDOH — HEALTH STABILITY: PHYSICAL HEALTH: EXERCISE COMMENTS: NI SQUATS, AND MARCHING. ALSO PERFORMED STANDING HIP ABDUCTION ISOMETRIC ACTIVITIES

## 2025-05-19 SDOH — HEALTH STABILITY: PHYSICAL HEALTH: EXERCISE TYPE: THA PROTOCOL

## 2025-05-19 SDOH — HEALTH STABILITY: PHYSICAL HEALTH: EXERCISE ACTIVITY: THA PROTOCOL

## 2025-05-19 SDOH — HEALTH STABILITY: PHYSICAL HEALTH: EXERCISE ACTIVITIES SETS: 2

## 2025-05-19 SDOH — HEALTH STABILITY: PHYSICAL HEALTH: PHYSICAL EXERCISE: SUPINE, SITTING, STANDING

## 2025-05-19 SDOH — HEALTH STABILITY: PHYSICAL HEALTH
EXERCISE COMMENTS: THA PROTOCOL INCLUDES THE FOLLOWING:  SUPINE- ANKLE PUMPS, QUAD SETS, GLUTEAL SETS, HIP FLEXION, HIP ABDUCTION, AND SAQS  SEATED- LAQS, ANKLE PUMPS, AND HIP ABDUCTION  STANDING- CALF RAISES, KNEE FLEXION, HIP FLEXION, HIP ABDUCTION, HIP EXTENSION, MI

## 2025-05-19 SDOH — HEALTH STABILITY: PHYSICAL HEALTH: PHYSICAL EXERCISE: 15

## 2025-05-19 ASSESSMENT — ENCOUNTER SYMPTOMS
MUSCLE WEAKNESS: 1
OCCASIONAL FEELINGS OF UNSTEADINESS: 0
PAIN: NO PAIN REPORTED
LIMITED RANGE OF MOTION: 1
LOSS OF SENSATION IN FEET: 0
ARTHRALGIAS: 1
DEPRESSION: 0
DENIES PAIN: 1

## 2025-05-19 ASSESSMENT — BALANCE ASSESSMENTS
COMMENTS: LOW RISK
STANDING TO SITTING: 4
STANDING TO SITTING: 4 - SITS SAFELY WITH MINIMAL USE OF HANDS
STANDING UNSUPPORTED WITH EYES CLOSED: 4
PICK UP OBJECT FROM THE FLOOR FROM A STANDING POSITION: 4 - ABLE TO PICK UP SLIPPER SAFELY AND EASILY
TURN 360 DEGREES: 4 - ABLE TO TURN 360 DEGREES SAFELY IN 4 SECONDS OR LESS
SITTING TO STANDING: 4 - ABLE TO STAND WITHOUT USING HANDS AND STABILIZE INDEPENDENTLY
STANDING UNSUPPORTED WITH FEET TOGETHER: 4
STANDING UNSUPPORTED ONE FOOT IN FRONT: 3 - ABLE TO PLACE FOOT AHEAD INDEPENDENTLY AND HOLD 30 SECONDS
STANDING UNSUPPORTED: 4
LONG VERSION TOTAL SCORE (MAX 56): 53
TRANSFERS: 4
TURN 360 DEGREES: 4
STANDING ON ONE LEG: 3 - ABLE TO LIFT LEG INDEPENDENTLY AND HOLD 5-10 SECONDS
STANDING ON ONE LEG: 3
STANDING UNSUPPORTED ONE FOOT IN FRONT: 3
REACHING FORWARD WITH OUTSTRETCHED ARM WHILE STANDING: 4 - CAN REACH FORWARD CONFIDENTLY 25 CM (10 INCHES)
STANDING UNSUPPORTED WITH EYES CLOSED: 4 - ABLE TO STAND 10 SECONDS SAFELY
STANDING UNSUPPORTED WITH FEET TOGETHER: 4 - ABLE TO PLACE FEET TOGETHER INDEPENDENTLY AND STAND 1 MINUTE SAFELY
SITTING TO STANDING: 4
TRANSFERS: 4 - ABLE TO TRANSFER SAFELY WITH MINOR USE OF HANDS

## 2025-05-19 ASSESSMENT — ACTIVITIES OF DAILY LIVING (ADL)
AMBULATION ASSISTANCE: 1
HOME_HEALTH_OASIS: 00
CURRENT_FUNCTION: INDEPENDENT
AMBULATION ASSISTANCE ON FLAT SURFACES: 1
OASIS_M1830: 00
PHYSICAL TRANSFERS ASSESSED: 1
AMBULATION ASSISTANCE: INDEPENDENT

## 2025-05-21 NOTE — PROGRESS NOTES
Physical Therapy  Physical Therapy Orthopedic Evaluation    Patient Name: Dora Trimble  MRN: [id number]  Today's Date: 5/22/2025  Time Calculation  Start Time: 1145  Stop Time: 1223  Time Calculation (min): 38 min    Insurance:  Visit number: 1 of [place]  Authorization info: JENNIFER  Insurance Type: Medicare and AARP  Cert date start: 5/22/25        Cert date end: 8/22/25                General:  Reason for visit: Left hip pain s/p GOPAL 4/28/25  Referred by: Valentine Snow PA-C    Current Problem  1. Acute pain of left hip  Follow Up In Physical Therapy      2. Unilateral primary osteoarthritis, left hip  Referral to Physical Therapy          Precautions  STEADI Fall Risk Score (The score of 4 or more indicates an increased risk of falling): 4  Precautions Comment: GOPAL precautions    Medical History Form: Reviewed (scanned into chart)    Subjective:     Chief Complaint: Patient presents to clinic with left hip pain s/p GOPAL. Transitioning from home PT.  Onset Date: 4/28/25    Current Condition:   Better    Pain:  Pain Assessment: 0-10  0-10 (Numeric) Pain Score: 3  Location: posterior left hip/glut  Description: achy  Aggravating Factors: Standing, Walking, and Stair negotiation  Relieving Factors:  Ice, Tylenol    Relevant Information (PMH & Previous Tests/Imaging): see chart   Previous Interventions/Treatments: None    Prior Level of Function (PLOF)  Patient previously independent with all ADLs  Exercise/Physical Activity: walking  Work/School: retired     Patients Living Environment: Reviewed and no concern  Stairs: has elevator in building    Primary Language: English    Patient's Goal(s) for Therapy: Wants to increase strength and be able to walk for longer distances.     Red Flags: Do you have any of the following? No  Fever/chills, unexplained weight changes, dizziness/fainting, unexplained change in bowel or bladder functions, unexplained malaise or muscle weakness, night pain/sweats, numbness or  tingling    Objective:    Gait: waddling gait, decreased L hip flexion, decreased heel strike, uses standard cane      Balance: SLS-     R: 2 sec.           L: 2 sec.    Palpation: denies TTP    Flexibility: mod tight HS, HF, quad, piriformis     Orthotics: no      ROM    Hip AROM (Degrees)      (R)  (L)  Flexion: 100  90   Extension: 10  5    Abduction: 40  30    Adduction: 5  0    ER:  45  20    IR:  5  0           Strength Testing    LE strength MMT  R L  Hip flexion  5/5 4-/5  Hip extension  4/5 3+/5  Hip abduction  4/5 3+/5  Hip adduction  4/5 3+/5  Hip IR   4/5 3+/5  Hip ER   4/5 3+/5  Knee extension 5/5 5/5  Knee flexion  5/5 5/5          Functional Screening  Sit to stand: requires UE support       Outcome Measures:  Other Measures  Lower Extremity Funtional Score (LEFS): 25     EDUCATION: home exercise program, plan of care, activity modifications, pain management, and injury pathology       Goals: Set and discussed today  Active       PT Problem       PT Goals       Start:  05/22/25    Expected End:  08/20/25       STG:  Patient will decrease pain to 0-2/10 in 4 weeks.   Patient will increase strength by >/= 1/2 mm grade in 4 weeks.  LEFS: +9 in 4 weeks.    LTG:  Patient will be able to walk >15 mins without increased pain in 8 weeks.  Patient will be able to ascend/descend stairs with step through pattern in 8 weeks.   Patient will increase LE flexibility to min tight in 8 weeks.   Patient will be able to maintain SLS >/= 10 seconds in 8 weeks.                Plan of care was developed with input and agreement by the patient      Treatment Performed:  Access Code: [email or web address]    Exercises  - Supine Bridge  - 2 x daily - 7 x weekly - 2 sets - 10 reps  - Sidelying Hip Abduction  - 2 x daily - 7 x weekly - 2 sets - 10 reps  - Supine Active Straight Leg Raise  - 2 x daily - 7 x weekly - 2 sets - 10 reps  - Sit to Stand  - 2 x daily - 7 x weekly - 2 sets - 10 reps  - Single Leg Stance  - 2 x daily - 7 x weekly -  1 sets - 3 reps - 30 sec hold      Charges: Low complexity eval, TE, self care  Clinical Presentation: Stable  Prognosis/Rehab Potential: Good    Assessment: Patient presents with signs and symptoms consistent with left GOPAL, resulting in limited participation in pain-free ADLs and inability to perform at their prior level of function. Pt would benefit from physical therapy to address the impairments found & listed previously in the objective section in order to return to safe and pain-free ADLs and prior level of function.       Plan:     Planned Interventions include: therapeutic exercise, self-care home management, manual therapy, therapeutic activities, gait training, neuromuscular coordination, vasopneumatic, dry needling, aquatic therapy  Frequency: 1-2 x Week  Duration: 8 Weeks    Olga Wadsworth, PT

## 2025-05-22 ENCOUNTER — EVALUATION (OUTPATIENT)
Dept: PHYSICAL THERAPY | Facility: CLINIC | Age: 65
End: 2025-05-22
Payer: MEDICARE

## 2025-05-22 DIAGNOSIS — M25.552 ACUTE PAIN OF LEFT HIP: Primary | ICD-10-CM

## 2025-05-22 DIAGNOSIS — M16.12 UNILATERAL PRIMARY OSTEOARTHRITIS, LEFT HIP: ICD-10-CM

## 2025-05-22 PROCEDURE — 97535 SELF CARE MNGMENT TRAINING: CPT | Mod: GP

## 2025-05-22 PROCEDURE — 97161 PT EVAL LOW COMPLEX 20 MIN: CPT | Mod: GP

## 2025-05-22 PROCEDURE — 97110 THERAPEUTIC EXERCISES: CPT | Mod: GP

## 2025-05-22 ASSESSMENT — ENCOUNTER SYMPTOMS
LOSS OF SENSATION IN FEET: 0
OCCASIONAL FEELINGS OF UNSTEADINESS: 0
DEPRESSION: 0

## 2025-05-22 ASSESSMENT — PAIN - FUNCTIONAL ASSESSMENT: PAIN_FUNCTIONAL_ASSESSMENT: 0-10

## 2025-05-22 ASSESSMENT — PAIN SCALES - GENERAL: PAINLEVEL_OUTOF10: 3

## 2025-05-27 NOTE — PROGRESS NOTES
Physical Therapy  Physical Therapy Treatment    Patient Name: Dora Trimble  MRN: [id number]  Today's Date: 5/28/2025  Time Calculation  Start Time: 1130  Stop Time: 1210  Time Calculation (min): 40 min    Insurance:  Visit number: 2 of MN  Authorization info: JENNIFER  Insurance Type: Medicare and AARP  Cert date start: 5/22/25        Cert date end: 8/22/25                 General:  Reason for visit: Left hip pain s/p GOPAL 4/28/25  Referred by: Valentine Snow PA-C       Current Problem  1. Acute pain of left hip  Follow Up In Physical Therapy          Precautions  Precautions Comment: GOPAL precautions    Pain Assessment: 0-10  0-10 (Numeric) Pain Score: 1    Subjective:   Patient reports that exercises are going well but a couple of them are challenging.   HEP Performed:  Yes    Objective:     Gait: decreased stance time LLE, decreased hip/knee flexion     Treatments:   R. Stepper, seat 6, L3.5, 5'   DBE 2'x2 directions  Slow airex MIP 2'  Fwd bosu lunges x 10 B  Tandem 4 Kg KB swing cw/ccw x 10 B  Sit to stand without UE support x 12  Gait training 40 ft x 4  Stair HF stretch 1' B  Stair HS stretch 1'x2 B    Access Code: [email or web address]    Charges: Tex2, NMx1    Assessment: Patient required verbal cues for even weight distribution and heel strike during gait training. Hip flexors are tight causing her to slightly flex forward during ambulation so will focus on posture when practicing at home.       Plan: Continue to increase LE strength, flexibility, balance and WB endurance for return to [facility].     Olga Wadsworth, PT

## 2025-05-28 ENCOUNTER — TREATMENT (OUTPATIENT)
Dept: PHYSICAL THERAPY | Facility: CLINIC | Age: 65
End: 2025-05-28
Payer: MEDICARE

## 2025-05-28 DIAGNOSIS — M25.552 ACUTE PAIN OF LEFT HIP: Primary | ICD-10-CM

## 2025-05-28 PROCEDURE — 97112 NEUROMUSCULAR REEDUCATION: CPT | Mod: GP

## 2025-05-28 PROCEDURE — 97110 THERAPEUTIC EXERCISES: CPT | Mod: GP

## 2025-05-28 ASSESSMENT — PAIN SCALES - GENERAL: PAINLEVEL_OUTOF10: 1

## 2025-05-28 ASSESSMENT — PAIN - FUNCTIONAL ASSESSMENT: PAIN_FUNCTIONAL_ASSESSMENT: 0-10

## 2025-05-29 NOTE — PROGRESS NOTES
Physical Therapy  Physical Therapy Treatment    Patient Name: Dora Trimble  MRN: [id number]  Today's Date: 5/30/2025  Time Calculation  Start Time: 1006  Stop Time: 1050  Time Calculation (min): 44 min    Insurance:  Visit number: 3 of MN  Authorization info: JENNIFER  Insurance Type: Medicare and AARP  Cert date start: 5/22/25        Cert date end: 8/22/25                 General:  Reason for visit: Left hip pain s/p GOPAL 4/28/25  Referred by: Valentine Snow PA-C       Current Problem  1. Acute pain of left hip  Follow Up In Physical Therapy          Precautions  Precautions Comment: GOPAL precautions    Pain Assessment: 0-10  0-10 (Numeric) Pain Score: 1    Subjective:   Patient reports that sleeping is still a challenge. Was not too sore after last session.     HEP Performed:  Yes    Objective:     Gait: decreased stance time LLE, decreased hip/knee flexion     Treatments:   R. Stepper, seat 6, L5, 5'   DBE 2'x2 directions  Slow airex MIP 2'  Fwd bosu lunges x 15 B  Tandem airex 4 Kg KB swing cw/ccw x 15 B  Bravo side steps 5#, 10x B  Sit to stand without UE support x 15  Total hip abd/ext 22#/33# 10x B   Stair HF stretch 1' B  Stair HS stretch 1'x2 B  Gait training 40 ft x 4    Access Code: [email or web address]    Charges: Tex2, NMx1    Assessment: Patient able to increase reps or progress to unstable surface during exercises today.       Plan: Continue to increase LE strength, flexibility, balance and WB endurance for return to PLOF.     Olga Wadsworth, PT

## 2025-05-30 ENCOUNTER — TREATMENT (OUTPATIENT)
Dept: PHYSICAL THERAPY | Facility: CLINIC | Age: 65
End: 2025-05-30
Payer: MEDICARE

## 2025-05-30 DIAGNOSIS — M25.552 ACUTE PAIN OF LEFT HIP: Primary | ICD-10-CM

## 2025-05-30 PROCEDURE — 97110 THERAPEUTIC EXERCISES: CPT | Mod: GP

## 2025-05-30 PROCEDURE — 97112 NEUROMUSCULAR REEDUCATION: CPT | Mod: GP

## 2025-05-30 ASSESSMENT — PAIN - FUNCTIONAL ASSESSMENT: PAIN_FUNCTIONAL_ASSESSMENT: 0-10

## 2025-05-30 ASSESSMENT — PAIN SCALES - GENERAL: PAINLEVEL_OUTOF10: 1

## 2025-06-02 NOTE — PROGRESS NOTES
"Physical Therapy  Physical Therapy Treatment    Patient Name: Dora Trimble  MRN: [id number]  Today's Date: 6/3/2025  Time Calculation  Start Time: 1140  Stop Time: 1220  Time Calculation (min): 40 min    Insurance:  Visit number: 4 of MN  Authorization info: JENNIFER  Insurance Type: Medicare and AARP  Cert date start: 5/22/25        Cert date end: 8/22/25                 General:  Reason for visit: Left hip pain s/p GOPAL 4/28/25  Referred by: Valentine Snow PA-C       Current Problem  1. Acute pain of left hip  Follow Up In Physical Therapy          Precautions  Precautions Comment: GOPAL precautions    Pain Assessment: 0-10  0-10 (Numeric) Pain Score: 1    Subjective:   Patient reports that sleeping is getting a little easier.     HEP Performed:  Yes    Objective:     Gait: decreased left hip ext    Treatments:   R. Stepper, seat 6, L5, 5'   DBE 2'x2 directions  Fwd bosu lunges x 15 B  6\" step up and hold 5\" x 10 B  SLS 4 Kg KB swing cw/ccw x 10 B  Bravo side steps 7.5#, 10x B  Sit to stand without UE support x 15  Total hip abd/ext 22#/33# 15x B  HSC 30#, 15x2  Leg press 75#, 15x2   Stair HF stretch 1' B  Stair HS stretch 1'x2 B    Access Code: [email or web address]    Charges: Tex2, NMx1    Assessment: Patient with improved dynamic balance on DBE today.       Plan: Continue to increase LE strength, flexibility, balance and WB endurance for return to PLOF.     Olga Wadsworth, PT  "

## 2025-06-03 ENCOUNTER — TREATMENT (OUTPATIENT)
Dept: PHYSICAL THERAPY | Facility: CLINIC | Age: 65
End: 2025-06-03
Payer: MEDICARE

## 2025-06-03 DIAGNOSIS — M25.552 ACUTE PAIN OF LEFT HIP: Primary | ICD-10-CM

## 2025-06-03 PROCEDURE — 97110 THERAPEUTIC EXERCISES: CPT | Mod: GP

## 2025-06-03 PROCEDURE — 97112 NEUROMUSCULAR REEDUCATION: CPT | Mod: GP

## 2025-06-03 ASSESSMENT — PAIN - FUNCTIONAL ASSESSMENT: PAIN_FUNCTIONAL_ASSESSMENT: 0-10

## 2025-06-03 ASSESSMENT — PAIN SCALES - GENERAL: PAINLEVEL_OUTOF10: 1

## 2025-06-05 NOTE — PROGRESS NOTES
"Physical Therapy  Physical Therapy Treatment    Patient Name: Dora Trimble  MRN: [id number]  Today's Date: 6/6/2025  Time Calculation  Start Time: 0915  Stop Time: 1000  Time Calculation (min): 45 min    Insurance:  Visit number: 5 of MN  Authorization info: JENNIFER  Insurance Type: Medicare and AARP  Cert date start: 5/22/25        Cert date end: 8/22/25                 General:  Reason for visit: Left hip pain s/p GOPAL 4/28/25  Referred by: Valentine Snow PA-C       Current Problem  1. Acute pain of left hip  Follow Up In Physical Therapy          Precautions  Precautions Comment: GOPAL precautions    Pain Assessment: 0-10  0-10 (Numeric) Pain Score: 1    Subjective:   Patient reports that she hasn't been doing sit to stands at home but knows that she needs to.    HEP Performed:  Yes    Objective:     Gait: decreased left hip ext    Treatments:   R. Stepper, seat 6, L5, 5'   DBE 2'x2 directions  Fwd bosu lunges x 15 B  Airex step up and hold 5\" x 12 B  SLS 4 Kg KB swing cw/ccw x 15 B  Bravo side steps 7.5#, 10x B  Leg press 80#, 15x2   Sit to stand without UE support x 15  Total hip abd/ext 22#/33# 15x B  HSC 30#, 15x2  Stair HF stretch 1'x2 B  Stair HS stretch 1'x2 B    Access Code: [email or web address]    Charges: Tex2, NMx1    Assessment: Patient tried KB swing on airex but was too challenging so completed on floor instead which resulted in decreased LOB.       Plan: Continue to increase LE strength, flexibility, balance and WB endurance for return to OF.     Olga Wadsworth, PT  "

## 2025-06-06 ENCOUNTER — TREATMENT (OUTPATIENT)
Dept: PHYSICAL THERAPY | Facility: CLINIC | Age: 65
End: 2025-06-06
Payer: MEDICARE

## 2025-06-06 DIAGNOSIS — M25.552 ACUTE PAIN OF LEFT HIP: Primary | ICD-10-CM

## 2025-06-06 PROCEDURE — 97112 NEUROMUSCULAR REEDUCATION: CPT | Mod: GP

## 2025-06-06 PROCEDURE — 97110 THERAPEUTIC EXERCISES: CPT | Mod: GP

## 2025-06-06 ASSESSMENT — PAIN SCALES - GENERAL: PAINLEVEL_OUTOF10: 1

## 2025-06-06 ASSESSMENT — PAIN - FUNCTIONAL ASSESSMENT: PAIN_FUNCTIONAL_ASSESSMENT: 0-10

## 2025-06-11 ENCOUNTER — TREATMENT (OUTPATIENT)
Dept: PHYSICAL THERAPY | Facility: CLINIC | Age: 65
End: 2025-06-11
Payer: MEDICARE

## 2025-06-11 DIAGNOSIS — M25.552 ACUTE PAIN OF LEFT HIP: Primary | ICD-10-CM

## 2025-06-11 PROCEDURE — 97112 NEUROMUSCULAR REEDUCATION: CPT | Mod: GP,CQ

## 2025-06-11 PROCEDURE — 97110 THERAPEUTIC EXERCISES: CPT | Mod: GP,CQ

## 2025-06-11 ASSESSMENT — PAIN - FUNCTIONAL ASSESSMENT: PAIN_FUNCTIONAL_ASSESSMENT: 0-10

## 2025-06-11 ASSESSMENT — PAIN SCALES - GENERAL: PAINLEVEL_OUTOF10: 2

## 2025-06-11 NOTE — PROGRESS NOTES
Physical Therapy  Physical Therapy Treatment    Patient Name: Dora Trimble  MRN: [id number]  Today's Date: 6/11/2025  Time Calculation  Start Time: 1135  Stop Time: 1215  Time Calculation (min): 40 min    Insurance:  Visit number: 6 of MN  Authorization info: JENNIFER  Insurance Type: Medicare and AARP  Cert date start: 5/22/25        Cert date end: 8/22/25                 General:  Reason for visit: Left hip pain s/p GOPAL 4/28/25  Referred by: Valentine Snow PA-C       Current Problem  1. Acute pain of left hip  Follow Up In Physical Therapy          Precautions  Precautions Comment: GOPAL precautions    Pain Assessment: 0-10  0-10 (Numeric) Pain Score: 2    Subjective:   Patient reports that her right shoulder has been bothering her more than her hip.  Hip discomfort is maybe a 2 today.    HEP Performed:  Yes    Objective:     Gait: decreased left hip ext    Treatments:   T5 NuStep- 4'  Air ex step up/hold alternating- 1.5'  DBE 2x1.5'  Bosu lunge alternating- 1.5'  SLS 4 Kg KB swing cw/ccw x 15 B  Hamstring curl 35# 1x20  Leg press 85# 1x20  Multihip abd/ext 22#/33# R/L x 16 ea.  Bravo side steps 7.5# R/L x 10 ea.  Sit to stand without UE support x 15  SS R/L HF/Ham- 1.5' ea.    Charges: TE 2, NME 1  Access Code: [email or web address]    Assessment:   Did well with the exercises.  Much better performing the SLS with a 2kg KB.  Very pleasant to work with.    Plan: Continue to increase LE strength, flexibility, balance and WB endurance for return to PLOF.     Jackson Campbell, PTA

## 2025-06-12 ENCOUNTER — OFFICE VISIT (OUTPATIENT)
Dept: ORTHOPEDIC SURGERY | Facility: CLINIC | Age: 65
End: 2025-06-12
Payer: MEDICARE

## 2025-06-12 ENCOUNTER — HOSPITAL ENCOUNTER (OUTPATIENT)
Dept: RADIOLOGY | Facility: CLINIC | Age: 65
Discharge: HOME | End: 2025-06-12
Payer: MEDICARE

## 2025-06-12 DIAGNOSIS — Z96.642 AFTERCARE FOLLOWING LEFT HIP JOINT REPLACEMENT SURGERY: Primary | ICD-10-CM

## 2025-06-12 DIAGNOSIS — Z96.642 AFTERCARE FOLLOWING LEFT HIP JOINT REPLACEMENT SURGERY: ICD-10-CM

## 2025-06-12 DIAGNOSIS — Z47.1 AFTERCARE FOLLOWING LEFT HIP JOINT REPLACEMENT SURGERY: Primary | ICD-10-CM

## 2025-06-12 DIAGNOSIS — Z47.1 AFTERCARE FOLLOWING LEFT HIP JOINT REPLACEMENT SURGERY: ICD-10-CM

## 2025-06-12 PROCEDURE — 1125F AMNT PAIN NOTED PAIN PRSNT: CPT | Performed by: PHYSICIAN ASSISTANT

## 2025-06-12 PROCEDURE — 1036F TOBACCO NON-USER: CPT | Performed by: PHYSICIAN ASSISTANT

## 2025-06-12 PROCEDURE — 99024 POSTOP FOLLOW-UP VISIT: CPT | Performed by: PHYSICIAN ASSISTANT

## 2025-06-12 PROCEDURE — 1159F MED LIST DOCD IN RCRD: CPT | Performed by: PHYSICIAN ASSISTANT

## 2025-06-12 PROCEDURE — 73502 X-RAY EXAM HIP UNI 2-3 VIEWS: CPT | Mod: LT

## 2025-06-12 PROCEDURE — 99212 OFFICE O/P EST SF 10 MIN: CPT | Performed by: PHYSICIAN ASSISTANT

## 2025-06-12 PROCEDURE — 73502 X-RAY EXAM HIP UNI 2-3 VIEWS: CPT | Mod: LEFT SIDE | Performed by: RADIOLOGY

## 2025-06-12 ASSESSMENT — PAIN SCALES - GENERAL: PAINLEVEL_OUTOF10: 2

## 2025-06-12 ASSESSMENT — PAIN - FUNCTIONAL ASSESSMENT: PAIN_FUNCTIONAL_ASSESSMENT: 0-10

## 2025-06-12 NOTE — PROGRESS NOTES
"Physical Therapy  Physical Therapy Treatment    Patient Name: Dora Trimble  MRN: [id number]  Today's Date: 6/13/2025  Time Calculation  Start Time: 0922  Stop Time: 1005  Time Calculation (min): 43 min    Insurance:  Visit number: 7 of [place]  Authorization info: JENNIFER  Insurance Type: Medicare and AARP  Cert date start: 5/22/25        Cert date end: 8/22/25                 General:  Reason for visit: Left hip pain s/p GOPAL 4/28/25  Referred by: Valentine Snow PA-C       Current Problem  1. Acute pain of left hip  Follow Up In Physical Therapy          Precautions  Precautions Comment: GOPAL precautions    Pain Assessment: 0-10  0-10 (Numeric) Pain Score: 2    Subjective:   Patient reports that it is much easier for her to do her SLR now.     HEP Performed:  Yes    Objective:     Gait: decreased left hip ext    Treatments:   R. Stepper, seat 6, L5, 5'   DBE 2'x2 directions  Fwd bosu lunges x 15 B  Bosu step up and hold 5\" x 10 B  Leg press 85#, 15x2    Sit to stand without UE support x 15   Bravo side steps 7.5#, 10x B  SLS 4 Kg KB swing cw/ccw x 15 B  Total hip abd/ext 22#/33# 15x B  HSC 35#, 15x2   Stair HF stretch 1'x2 B  Stair HS stretch 1'x2 B    Access Code: [email or web address]    Charges: Tex2, NMx1    Assessment: Patient with increased ease during sit to stand today with no LOB.      Plan: Continue to increase LE strength, flexibility, balance and WB endurance for return to OF.     Olga Wadsworth, PT  "

## 2025-06-12 NOTE — LETTER
June 12, 2025     Patient: Dora Trimble   YOB: 1960   Date of Visit: 6/12/2025       To Whom It May Concern:    Dora Trimble was seen in my clinic on 6/12/2025 at 1:20 pm. Please excuse Dora for her absence from work on this day to make the appointment. Patient is able to return to work with no restrictions as of 7/07/25.    If you have any questions or concerns, please don't hesitate to call.         Sincerely,         Valentine Diaz PA-C        CC: No Recipients

## 2025-06-12 NOTE — PROGRESS NOTES
DIMITRI Michaud PA-C, ATC  Adult Reconstruction and Joint Replacement Surgery  Phone: [phone]     Fax:[phone]            Chief Complaint   Patient presents with    Left Hip - Post-op, Pain       HPI:    Dora Trimble is a pleasant 65 y.o. year-old female here for follow-up of their side: left total hip arthroplasty by Dr. Ingram.  The patient is approximately 6 week(s) postop.The patient has no mechanical symptoms.  The patient has no swelling and pain.   The patients wound has healed uneventfully.  The patient has been doing HEP and/or outpatient PT.  No complications postoperatively.      Review of Systems  Medical History[1]  Problem List[2]  Medication Documentation Review Audit       Reviewed by Tiffanie Wise LPN (Licensed Nurse) on 25 at 1307      Medication Order Taking? Sig Documenting Provider Last Dose Status   levothyroxine (Synthroid, Levoxyl) 100 mcg tablet [id number] No Take 1 tablet (100 mcg) by mouth once daily. Olga Johnson DO 2025 Morning Active   pantoprazole (ProtoNix) 40 mg EC tablet [id number]  Take 1 tablet (40 mg) by mouth once daily. Do not crush, chew, or split. Valentine Diaz PA-C   25 2359   polyethylene glycol (Glycolax, Miralax) 17 gram/dose powder [id number]  Mix 17 g of powder and drink once daily. Mix 1 cap (17g) into 8 ounces of fluid. Valentine Diaz PA-C  Active                  RX Allergies[3]  Social History     Socioeconomic History    Marital status:      Spouse name: Not on file    Number of children: Not on file    Years of education: Not on file    Highest education level: Not on file   Occupational History    Not on file   Tobacco Use    Smoking status: Never    Smokeless tobacco: Never   Substance and Sexual Activity    Alcohol use: Not Currently     Comment: occasionally    Drug use: Never    Sexual activity: Not on file   Other Topics Concern    Not on file   Social History Narrative    Not on file      Social Drivers of Health     Financial Resource Strain: Low Risk  (4/28/2025)    Overall Financial Resource Strain (CARDIA)     Difficulty of Paying Living Expenses: Not hard at all   Food Insecurity: Not on file   Transportation Needs: No Transportation Needs (5/19/2025)    OASIS : Transportation     Lack of Transportation (Medical): No     Lack of Transportation (Non-Medical): No     Patient Unable or Declines to Respond: No   Physical Activity: Not on file   Stress: Not on file   Social Connections: Feeling Socially Integrated (5/19/2025)    OASIS : Social Isolation     Frequency of experiencing loneliness or isolation: Never   Intimate Partner Violence: Not on file   Housing Stability: Low Risk  (4/28/2025)    Housing Stability Vital Sign     Unable to Pay for Housing in the Last Year: No     Number of Times Moved in the Last Year: 0     Homeless in the Last Year: No     Surgical History[4]    Physical Exam  side: left Hip  There were no vitals filed for this visit.  AxO x 3 in NAD, appears stated age. Cooperative.   Assistive Device: cane. Coordination and balance intact.  Skin inact over bilateral upper and lower extremities, no erythema, ecchymosis, temperature changes. No popliteal lymphadenopathy,  No other overlying lesion  mood: euthymic  Respirations non labored  Surgical hip demonstrates pain free ROM with mild tenderness to palpation over greater trochanter laterally.  The incision is midline healing well with no signs of surrounding infection, dehiscence or drainage.   Neurovascularly back to baseline  5/5 hip flexion/knee extension/DF/PF/EHL  SILT in jf/saph/ per/tib distribution   Extremities warm and well perfused.  No lower extremity calf tenderness or swelling  2+ Femoral/DP/PT pulses bilaterally    IMAGING:  No images are attached to the encounter.    I personally reviewed multiple views of the hip today in clinic.  Status post side: left Total Hip Arthroplasty. The implant is  well fixed, well aligned.  No evidence of juan c-implant fracture, lucency or dislocation. Leg lengths closely restored.    Impression/Plan:    Dora Trimble is doing well post-operatively and happy with the results of the operation.     S/P Total side: left Hip  Arthroplasty  I talked with patient at length about activity precautions and dislocation precautions in detail. I talked with patient at length about activity precautions and dislocation precautions in detail which include avoidance of hip flexion > 90 degrees with simultaneous internal rotation.  Patient can progress activities as tolerated. And understands their permanent precautions. At this time, you may gradually increase your activities and get back to a normal, low-impact lifestyle. Please avoid running, jumping, and heavy lifting.    This was  demonstrated in the room by MACHO and patient verbalized understanding. Patient should also limit running, jumping, weight lifting and repetitive activity. The patient verbalizes understanding of these permanent precautions.        2. Continue HEP or outpatient PT, per protocol.    3. Continue Post-operative instructions.    4. Discussed the importance of dental prophylactic dental antibiotics lifelong. Patient [facility]       [facility]acy or surgeons office.    She was also given a work release to return to work at the end of June.  She was given a referral for Dr. Aguero for right shoulder pain  All questions were answered.    Follow-up 1 year with xrays at next visit.      Valentine Diaz MPAS, PA-C, ATC  Orthopedic Physician Assisant  Adult Reconstruction and Total Joint Replacement  General Orthopedics  Department of Orthopaedic Surgery  [place] [phone]  Haiko messaging preferred           [1]   Past Medical History:  Diagnosis Date    Hypothyroidism     OA (osteoarthritis) of hip     Obesity  (BMI 30-39.9)    [2]   Patient Active Problem List  Diagnosis    Abnormal mammogram of left breast    Arthritis of left hip    Body aches    Chest pain    Acute pain of left hip    Allergic rhinitis    Chronic rhinitis    Dermatitis, eczematoid    Nummular dermatitis    Dysphagia    Dysuria    Hyperglycemia    Hyperlipidemia    Hypothyroidism    Left lower quadrant abdominal tenderness    Low back pain    Neuropathic pain of left thigh    Obesity    Osteoarthritis of hip    Paresthesia of left foot    Shoulder pain    Skin rash    Vitamin D deficiency    Weight gain    Unilateral primary osteoarthritis, left hip   [3]   Allergies  Allergen Reactions    Bee Pollen Unknown    Cat Dander Unknown    Shellfish Containing Products Unknown   [4]   Past Surgical History:  Procedure Laterality Date    COLONOSCOPY  12/10/2013    Complete Colonoscopy    OTHER SURGICAL HISTORY  12/11/2013    Thyroid Surgery Sub-Total Thyroidectomy    OTHER SURGICAL HISTORY  12/10/2013    Nerve Block Transforaminal Epidural Lumbar

## 2025-06-13 ENCOUNTER — TREATMENT (OUTPATIENT)
Dept: PHYSICAL THERAPY | Facility: CLINIC | Age: 65
End: 2025-06-13
Payer: MEDICARE

## 2025-06-13 DIAGNOSIS — M25.552 ACUTE PAIN OF LEFT HIP: Primary | ICD-10-CM

## 2025-06-13 PROCEDURE — 97110 THERAPEUTIC EXERCISES: CPT | Mod: GP

## 2025-06-13 PROCEDURE — 97112 NEUROMUSCULAR REEDUCATION: CPT | Mod: GP

## 2025-06-13 ASSESSMENT — PAIN - FUNCTIONAL ASSESSMENT: PAIN_FUNCTIONAL_ASSESSMENT: 0-10

## 2025-06-13 ASSESSMENT — PAIN SCALES - GENERAL: PAINLEVEL_OUTOF10: 2

## 2025-06-13 NOTE — PROGRESS NOTES
"Physical Therapy  Physical Therapy Treatment    Patient Name: Dora Trimble  MRN: [id number]  Today's Date: 6/16/2025  Time Calculation  Start Time: 1425  Stop Time: 1505  Time Calculation (min): 40 min    Insurance:  Visit number: 8 of [place]  Authorization info: JENNIFER  Insurance Type: Medicare and AARP  Cert date start: 5/22/25        Cert date end: 8/22/25                 General:  Reason for visit: Left hip pain s/p GOPAL 4/28/25  Referred by: Valentine Snow PA-C       Current Problem  1. Acute pain of left hip  Follow Up In Physical Therapy          Precautions  Precautions Comment: GOPAL precautions    Pain Assessment: 0-10  0-10 (Numeric) Pain Score: 2    Subjective:   Patient reports that it is much easier for her to do her SLR now.     HEP Performed:  Yes    Objective:     Gait: decreased left hip ext    Treatments:   R. Stepper, seat 6, L5, 5'   DBE 2'x2 directions  Fwd bosu lunges x 15 B  Bosu step up and hold 5\" x 10 B  Leg press 90#, 15x2    Sit to stand without UE support x 15   Bravo side steps 7.5#, 10x B  SLS 4 Kg KB swing cw/ccw x 15 B  Total hip abd/ext 22#/33# 15x B  HSC 35#, 15x2   Stair HF stretch 1'x2 B  Stair HS stretch 1'x2 B    Access Code: [email or web address]    Charges: Tex2, NMx1    Assessment: Patient only requires 1 rest break during 15 reps of sit to stand today.      Plan: Assess for discharge next visit.     Olga Wadsworth, PT  "

## 2025-06-16 ENCOUNTER — TREATMENT (OUTPATIENT)
Dept: PHYSICAL THERAPY | Facility: CLINIC | Age: 65
End: 2025-06-16
Payer: MEDICARE

## 2025-06-16 DIAGNOSIS — M25.552 ACUTE PAIN OF LEFT HIP: Primary | ICD-10-CM

## 2025-06-16 PROCEDURE — 97112 NEUROMUSCULAR REEDUCATION: CPT | Mod: GP

## 2025-06-16 PROCEDURE — 97110 THERAPEUTIC EXERCISES: CPT | Mod: GP

## 2025-06-16 ASSESSMENT — PAIN - FUNCTIONAL ASSESSMENT: PAIN_FUNCTIONAL_ASSESSMENT: 0-10

## 2025-06-16 ASSESSMENT — PAIN SCALES - GENERAL: PAINLEVEL_OUTOF10: 2

## 2025-06-18 ENCOUNTER — APPOINTMENT (OUTPATIENT)
Dept: PRIMARY CARE | Facility: CLINIC | Age: 65
End: 2025-06-18
Payer: COMMERCIAL

## 2025-06-18 ENCOUNTER — APPOINTMENT (OUTPATIENT)
Dept: PHYSICAL THERAPY | Facility: CLINIC | Age: 65
End: 2025-06-18
Payer: MEDICARE

## 2025-06-18 VITALS
OXYGEN SATURATION: 95 % | HEART RATE: 79 BPM | TEMPERATURE: 97.4 F | SYSTOLIC BLOOD PRESSURE: 132 MMHG | WEIGHT: 234.2 LBS | HEIGHT: 65 IN | DIASTOLIC BLOOD PRESSURE: 84 MMHG | BODY MASS INDEX: 39.02 KG/M2

## 2025-06-18 DIAGNOSIS — M25.552 ACUTE PAIN OF LEFT HIP: Primary | ICD-10-CM

## 2025-06-18 DIAGNOSIS — Z00.00 ROUTINE GENERAL MEDICAL EXAMINATION AT HEALTH CARE FACILITY: Primary | ICD-10-CM

## 2025-06-18 PROCEDURE — 1160F RVW MEDS BY RX/DR IN RCRD: CPT | Performed by: INTERNAL MEDICINE

## 2025-06-18 PROCEDURE — 1159F MED LIST DOCD IN RCRD: CPT | Performed by: INTERNAL MEDICINE

## 2025-06-18 PROCEDURE — 1170F FXNL STATUS ASSESSED: CPT | Performed by: INTERNAL MEDICINE

## 2025-06-18 PROCEDURE — G0438 PPPS, INITIAL VISIT: HCPCS | Performed by: INTERNAL MEDICINE

## 2025-06-18 PROCEDURE — 3008F BODY MASS INDEX DOCD: CPT | Performed by: INTERNAL MEDICINE

## 2025-06-18 ASSESSMENT — ACTIVITIES OF DAILY LIVING (ADL)
TAKING_MEDICATION: INDEPENDENT
DOING_HOUSEWORK: INDEPENDENT
BATHING: INDEPENDENT
DRESSING: INDEPENDENT
MANAGING_FINANCES: INDEPENDENT
GROCERY_SHOPPING: INDEPENDENT

## 2025-06-18 ASSESSMENT — ENCOUNTER SYMPTOMS
DEPRESSION: 0
LOSS OF SENSATION IN FEET: 0
OCCASIONAL FEELINGS OF UNSTEADINESS: 1

## 2025-06-18 ASSESSMENT — PATIENT HEALTH QUESTIONNAIRE - PHQ9
1. LITTLE INTEREST OR PLEASURE IN DOING THINGS: NOT AT ALL
SUM OF ALL RESPONSES TO PHQ9 QUESTIONS 1 AND 2: 0
2. FEELING DOWN, DEPRESSED OR HOPELESS: NOT AT ALL

## 2025-06-18 ASSESSMENT — VISUAL ACUITY
OD_CC: 20/40
OS_CC: 20/25

## 2025-06-18 NOTE — PROGRESS NOTES
Primary care office Note      Name: Dora Trimble, Age: 65 y.o., Gender: female, MRN: [id number]   Pharmacy:   [facility]pharm[facility] #[id number] [facility] [address] [facility]  [id number] [place] [address]  Phone: [phone] Fax: [phone]     [facility]  [address], [address]  [facility] [address]  Phone: [phone] Fax: [phone]     PCP: Olga Johnson        Subjective:   Chief Complaint   Patient presents with    Welcome To Medicare        Dora Trimble is a 65 y.o. female who presented to the clinic today for welcome to medicare wellness visit and follow up.     HPI:   Dora Trimble is a 65 y.o. female  S/p Left hip replacement --> doing well, this Friday will be her last PT  Right shoulder pain - likely after using her cane with the right hand     The patient denies headaches, lightheadedness, changes in speech/vision, photophobia, dysphagia, diaphoresis, Chest pain, dyspnea, Abdominal pain, recent falls or trauma, and changes in bowel/urinary habits.     ROS:   12 points review of system is negative excepted as stated above in the HPI.    Medical History    Problem List:  Problem List[1]   PMH:    has a past medical history of Hypothyroidism, OA (osteoarthritis) of hip, and Obesity (BMI 30-39.9).   Allergies:   Allergies[2]   Surgical Hx:   Surgical History[3]   Social HX:   Social History[4]     MEDS:   Current Outpatient Medications   Medication Instructions    levothyroxine (SYNTHROID, LEVOXYL) 100 mcg, oral, Daily        Objective Data     Objective:   Visit Vitals  /84   Pulse 79   Temp 36.3 °C (97.4 °F) (Temporal)        Physical Examination:   GE; sitting comfortably in a chair, in NAD  HEENT; AT/NC, no conjunctival pallor, anicteric sclera  Neck; Supple neck, no LAD/JVD  Chest; CTAbl, no adventitious sounds  CVS; s1 and s2 only no MGR, RRR  Ext; no cyanosis/clubbing/edema, pulses intact  Neuro; Aox3  Psych; normal mood     Last Labs:   CBC:   WBC   Date Value  Ref Range Status   04/07/2025 6.2 4.4 - 11.3 x10*3/uL Final   09/03/2024 6.3 4.4 - 11.3 x10*3/uL Final   01/26/2023 6.5 4.4 - 11.3 x10E9/L Final     Hemoglobin   Date Value Ref Range Status   04/07/2025 12.9 12.0 - 16.0 g/dL Final   09/03/2024 13.7 12.0 - 16.0 g/dL Final   01/26/2023 13.2 12.0 - 16.0 g/dL Final     MCV   Date Value Ref Range Status   04/07/2025 87 80 - 100 fL Final   09/03/2024 90 80 - 100 fL Final   01/26/2023 88 80 - 100 fL Final     Platelets   Date Value Ref Range Status   04/07/2025 327 150 - 450 x10*3/uL Final   09/03/2024 329 150 - 450 x10*3/uL Final   01/26/2023 367 150 - 450 x10E9/L Final      CMP:   Sodium   Date Value Ref Range Status   04/07/2025 140 136 - 145 mmol/L Final   09/03/2024 141 136 - 145 mmol/L Final   01/26/2023 141 136 - 145 mmol/L Final     Potassium   Date Value Ref Range Status   04/07/2025 3.9 3.5 - 5.3 mmol/L Final   09/03/2024 4.3 3.5 - 5.3 mmol/L Final   01/26/2023 4.4 3.5 - 5.3 mmol/L Final     Chloride   Date Value Ref Range Status   04/07/2025 103 98 - 107 mmol/L Final   09/03/2024 102 98 - 107 mmol/L Final   01/26/2023 105 98 - 107 mmol/L Final     Urea Nitrogen   Date Value Ref Range Status   04/07/2025 19 6 - 23 mg/dL Final   09/03/2024 15 6 - 23 mg/dL Final   01/26/2023 18 6 - 23 mg/dL Final     Creatinine   Date Value Ref Range Status   04/07/2025 0.74 0.50 - 1.05 mg/dL Final   09/03/2024 0.78 0.50 - 1.05 mg/dL Final   01/26/2023 0.84 0.50 - 1.05 mg/dL Final     eGFR   Date Value Ref Range Status   04/07/2025 90 >60 mL/min/1.73m*2 Final     Comment:     Calculations of estimated GFR are performed using the 2021 CKD-EPI Study Refit equation without the race variable for the IDMS-Traceable creatinine methods.  https://jasn.asnjournals.org/content/early/2021/09/22/ASN.3572956400   09/03/2024 85 >60 mL/min/1.73m*2 Final     Comment:     Calculations of estimated GFR are performed using the 2021 CKD-EPI Study Refit equation without the race variable for the  IDMS-Traceable creatinine methods.  https://jasn.asnjournals.org/content/early/2021/09/22/ASN.2321285206      A1c:   Hemoglobin A1C   Date Value Ref Range Status   04/07/2025 5.7 (H) See comment % Final   09/03/2024 5.8 (H) see below % Final   09/12/2023 5.6 % Final     Comment:          Diagnosis of Diabetes-Adults   Non-Diabetic: < or = 5.6%   Increased risk for developing diabetes: 5.7-6.4%   Diagnostic of diabetes: > or = 6.5%  .       Monitoring of Diabetes                Age (y)     Therapeutic Goal (%)   Adults:          >18           <7.0   Pediatrics:    13-18           <7.5                   7-12           <8.0                   0- 6            7.5-8.5   American Diabetes Association. Diabetes Care 33(S1), Jan 2010.        Other labs;   Vit D:   Vitamin D, 25-Hydroxy, Total   Date Value Ref Range Status   09/03/2024 29 (L) 30 - 100 ng/mL Final     Vitamin D, 25-Hydroxy   Date Value Ref Range Status   09/12/2023 20 (A) ng/mL Final     Comment:     .  DEFICIENCY:         < 20   NG/ML  INSUFFICIENCY:      20-29  NG/ML  SUFFICIENCY:         NG/ML    THIS ASSAY ACCURATELY QUANTIFIES THE SUM OF  VITAMIN D3, 25-HYDROXY AND VIT D2,25-HYDROXY.   01/26/2023 18 (A) ng/mL Final     Comment:     .  DEFICIENCY:         < 20   NG/ML  INSUFFICIENCY:      20-29  NG/ML  SUFFICIENCY:         NG/ML    THIS ASSAY ACCURATELY QUANTIFIES THE SUM OF  VITAMIN D3, 25-HYDROXY AND VIT D2,25-HYDROXY.        TSH:  TSH   Date Value Ref Range Status   09/12/2023 1.55 0.44 - 3.98 mIU/L Final     Comment:      TSH testing is performed using different testing    methodology at [facility] than at other    system hospitals. Direct result comparisons should    only be made within the same method.   01/26/2023 1.63 0.44 - 3.98 mIU/L Final     Comment:      TSH testing is performed using different testing    methodology at [facility] than at other    [facility] hospitals. Direct result comparisons should     only be made within the same method.       Thyroid Stimulating Hormone   Date Value Ref Range Status   09/03/2024 1.50 0.44 - 3.98 mIU/L Final        Assessment and Plan     Problem List Items Addressed This Visit    None  Visit Diagnoses         Routine general medical examination at health care facility    -  Primary    Relevant Orders    1 Year Follow Up In Primary Care - Wellness Exam            Olga Johnson DO            [1]   Patient Active Problem List  Diagnosis    Abnormal mammogram of left breast    Arthritis of left hip    Body aches    Chest pain    Acute pain of left hip    Allergic rhinitis    Chronic rhinitis    Dermatitis, eczematoid    Nummular dermatitis    Dysphagia    Dysuria    Hyperglycemia    Hyperlipidemia    Hypothyroidism    Left lower quadrant abdominal tenderness    Low back pain    Neuropathic pain of left thigh    Obesity    Osteoarthritis of hip    Paresthesia of left foot    Shoulder pain    Skin rash    Vitamin D deficiency    Weight gain    Unilateral primary osteoarthritis, left hip   [2]   Allergies  Allergen Reactions    Bee Pollen Unknown    Cat Dander Unknown    Shellfish Containing Products Unknown   [3]   Past Surgical History:  Procedure Laterality Date    COLONOSCOPY  12/10/2013    Complete Colonoscopy    OTHER SURGICAL HISTORY  12/11/2013    Thyroid Surgery Sub-Total Thyroidectomy    OTHER SURGICAL HISTORY  12/10/2013    Nerve Block Transforaminal Epidural Lumbar   [4]   Social History  Tobacco Use    Smoking status: Never    Smokeless tobacco: Never   Vaping Use    Vaping status: Never Used   Substance Use Topics    Alcohol use: Yes     Comment: occasionally    Drug use: Never

## 2025-06-18 NOTE — LETTER
June 23, 2025     Patient: Dora Trimble   YOB: 1960   Date of Visit: 6/18/2025       To Whom It May Concern:    Dora Trimble was seen in my clinic on 6/18/2025 at 11:40 am. Please excuse Dora for her absence from work on this day to make the appointment.    If you have any questions or concerns, please don't hesitate to call.         Sincerely,         Olga Johnson, DO        CC: No Recipients

## 2025-06-19 NOTE — PROGRESS NOTES
Physical Therapy  Physical Therapy Orthopedic Progress Note and Discharge Note    Patient Name: Dora Trimble  MRN: [id number]  Today's Date: 6/20/2025  Time Calculation  Start Time: 0915  Stop Time: 0940  Time Calculation (min): 25 min    Insurance:  Visit number: 9 of [place]  Authorization info: JENNIFER  Insurance Type: Medicare and [employer]  Cert date start: 5/22/25        Cert date end: 8/22/25                 General:  Reason for visit: Left hip pain s/p GOPAL 4/28/25  Referred by: Valentine Snow PA-C    Current Problem  1. Acute pain of left hip  Follow Up In Physical Therapy          Precautions  Precautions Comment: GOPAL precautions      Subjective:    Patient reports hip has been feeling good and is back to daily activities.     Current Condition:   Better    Pain:  Pain Assessment: 0-10  0-10 (Numeric) Pain Score: 1    Self Reported Function (0-100%) = 80%  (100% being back to PLOF)    Objective:    Gait: decreased waddling gait, increased L hip flexion, increased heel strike, uses standard cane                       Balance: SLS-     R: 30 sec.           L: 30 sec.     Palpation: denies TTP     Flexibility: min tight HS, HF, quad, piriformis         ROM     Hip AROM (Degrees)                             (R)                    (L)  Flexion:            100                   90           Extension:        10                     5                        Abduction:       40                     35                       Adduction:       5                      0                        ER:                    45                     30                       IR:                     5                      0                                                                 Strength Testing     LE strength MMT          R         L  Hip flexion                   5/5       4/5  Hip extension              4/5       4/5  Hip abduction              4+/5       4/5  Hip adduction              4+/5       4/5  Hip IR                            4+/5       4/5  Hip ER                          4+/5       4/5  Knee extension            5/5       5/5  Knee flexion                 5/5       5/5                                       Functional Screening  Sit to stand: does not require UE support for most surfaces      Outcome Measures: Updated 6/20/2025  Other Measures  Lower Extremity Funtional Score (LEFS): 41     EDUCATION: home exercise program, plan of care, activity modifications, pain management, and injury pathology       Goals: Updated 6/20/2025  Resolved       PT Problem       PT Goals (Met)       Start:  05/22/25    Expected End:  08/20/25    Resolved:  06/20/25    STG:  Patient will decrease pain to 0-2/10 in 4 weeks.   Patient will increase strength by >/= 1/2 mm grade in 4 weeks.  LEFS: +9 in 4 weeks.    LTG:  Patient will be able to walk >15 mins without increased pain in 8 weeks.  Patient will be able to ascend/descend stairs with step through pattern in 8 weeks.   Patient will increase LE flexibility to min tight in 8 weeks.   Patient will be able to maintain SLS >/= 10 seconds in 8 weeks.                Plan of care was developed with input and agreement by the patient      Treatment Performed:  DA Stepper, seat 6, L5, 5'   Recheck completed   Reviewed HEP     Access Code: [email or web address]     Charges: Tex2      Assessment: Patient has demonstrated significant improvement in LE strength, flexibility, balance and WB endurance. Is back to all functional activities. Reviewed HEP and answered all questions.       Plan: Updated 6/20/2025    Patient has met goals and will be discharged to independent HEP.    Olga Wadsworth, PT

## 2025-06-20 ENCOUNTER — TREATMENT (OUTPATIENT)
Dept: PHYSICAL THERAPY | Facility: CLINIC | Age: 65
End: 2025-06-20
Payer: MEDICARE

## 2025-06-20 DIAGNOSIS — M25.552 ACUTE PAIN OF LEFT HIP: Primary | ICD-10-CM

## 2025-06-20 PROCEDURE — 97110 THERAPEUTIC EXERCISES: CPT | Mod: GP

## 2025-06-20 ASSESSMENT — PAIN - FUNCTIONAL ASSESSMENT: PAIN_FUNCTIONAL_ASSESSMENT: 0-10

## 2025-06-20 ASSESSMENT — PAIN SCALES - GENERAL: PAINLEVEL_OUTOF10: 1

## 2025-07-08 ENCOUNTER — HOSPITAL ENCOUNTER (OUTPATIENT)
Dept: RADIOLOGY | Facility: CLINIC | Age: 65
Discharge: HOME | End: 2025-07-08
Payer: MEDICARE

## 2025-07-08 DIAGNOSIS — Z12.31 ENCOUNTER FOR SCREENING MAMMOGRAM FOR MALIGNANT NEOPLASM OF BREAST: ICD-10-CM

## 2025-07-08 PROCEDURE — 77067 SCR MAMMO BI INCL CAD: CPT | Performed by: RADIOLOGY

## 2025-07-08 PROCEDURE — 77063 BREAST TOMOSYNTHESIS BI: CPT | Performed by: RADIOLOGY

## 2025-07-08 PROCEDURE — 77067 SCR MAMMO BI INCL CAD: CPT

## 2025-12-22 ENCOUNTER — APPOINTMENT (OUTPATIENT)
Dept: PRIMARY CARE | Facility: CLINIC | Age: 65
End: 2025-12-22
Payer: MEDICARE

## (undated) DEVICE — HOOD, SURGICAL, FLYTE HYBRID

## (undated) DEVICE — SYRINGE, 60 CC, IRRIGATION, BULB, CONTRO-BULB, PAPER POUCH

## (undated) DEVICE — NEEDLE, SPINAL, QUINCKE, 18 G X 3.5 IN, PINK HUB

## (undated) DEVICE — RETRIEVER, SUTURE, STERILE

## (undated) DEVICE — DRAPE, INCISE, STERI DRAPE, LARGE, 23 X 17 IN, DISPOSABLE, STERILE

## (undated) DEVICE — SYRINGE, 50 CC, LUER LOCK

## (undated) DEVICE — SUTURE, ETHIBOND XTRA, 5 V-37, GRN/BR, LF

## (undated) DEVICE — DRAPE, IRRIGATION, W/POUCH, ADHESIVE STRIP, STERI DRAPE, 19 X 23 IN, DISPOSABLE, STERILE

## (undated) DEVICE — GLOVE, SURGICAL, PROTEXIS PI , 6.5, PF, LF

## (undated) DEVICE — SUTURE, VICRYL, 2-0, 18 IN CP-2, UNDYED

## (undated) DEVICE — DRAPE, ISOLATION, INCISE, W/POUCH, STERI DRAPE, 125 X 83 IN, DISPOSABLE, STERILE

## (undated) DEVICE — SUTURE, VICRYL, 0, 18 IN, CT-1, UNDYED

## (undated) DEVICE — Device

## (undated) DEVICE — GLOVE, SURGICAL, PROTEXIS PI ORTHO, 8.0, PF, LF

## (undated) DEVICE — STRIP, SKIN CLOSURE, STERI STRIP, REINFORCED, 0.5 X 4 IN

## (undated) DEVICE — GLOVE, SURGICAL, PROTEXIS PI , 7.5, PF, LF

## (undated) DEVICE — GLOVE, SURGICAL, PROTEXIS PI BLUE W/NEUTHERA, 7.0, PF, LF

## (undated) DEVICE — BLADE, SAW, SAGITTAL, 18.5 X 73 X 0.76 MM, STAINLESS STEEL, STERILE